# Patient Record
Sex: FEMALE | Race: OTHER | NOT HISPANIC OR LATINO
[De-identification: names, ages, dates, MRNs, and addresses within clinical notes are randomized per-mention and may not be internally consistent; named-entity substitution may affect disease eponyms.]

---

## 2023-07-26 ENCOUNTER — NON-APPOINTMENT (OUTPATIENT)
Age: 23
End: 2023-07-26

## 2023-07-26 ENCOUNTER — APPOINTMENT (OUTPATIENT)
Dept: OBGYN | Facility: CLINIC | Age: 23
End: 2023-07-26
Payer: MEDICAID

## 2023-07-26 VITALS
SYSTOLIC BLOOD PRESSURE: 108 MMHG | DIASTOLIC BLOOD PRESSURE: 62 MMHG | WEIGHT: 122.6 LBS | BODY MASS INDEX: 20.43 KG/M2 | HEIGHT: 65 IN

## 2023-07-26 DIAGNOSIS — Z11.1 ENCOUNTER FOR SCREENING FOR RESPIRATORY TUBERCULOSIS: ICD-10-CM

## 2023-07-26 DIAGNOSIS — Z13.1 ENCOUNTER FOR SCREENING FOR DIABETES MELLITUS: ICD-10-CM

## 2023-07-26 PROBLEM — Z00.00 ENCOUNTER FOR PREVENTIVE HEALTH EXAMINATION: Status: ACTIVE | Noted: 2023-07-26

## 2023-07-26 PROCEDURE — 81003 URINALYSIS AUTO W/O SCOPE: CPT | Mod: QW

## 2023-07-26 PROCEDURE — 99204 OFFICE O/P NEW MOD 45 MIN: CPT

## 2023-07-27 LAB
ABO + RH PNL BLD: NORMAL
BILIRUB UR QL STRIP: NORMAL
BLD GP AB SCN SERPL QL: NORMAL
C TRACH RRNA SPEC QL NAA+PROBE: NOT DETECTED
ESTIMATED AVERAGE GLUCOSE: 97 MG/DL
GLUCOSE 1H P 50 G GLC PO SERPL-MCNC: 126 MG/DL
GLUCOSE UR-MCNC: NORMAL
HBA1C MFR BLD HPLC: 5 %
HBV SURFACE AG SER QL: NONREACTIVE
HCG UR QL: 1 EU/DL
HCV AB SER QL: NONREACTIVE
HCV S/CO RATIO: 0.11 S/CO
HGB A MFR BLD: 97.6 %
HGB A2 MFR BLD: 2.4 %
HGB FRACT BLD-IMP: NORMAL
HGB UR QL STRIP.AUTO: NORMAL
HIV1+2 AB SPEC QL IA.RAPID: NONREACTIVE
KETONES UR-MCNC: NORMAL
LEUKOCYTE ESTERASE UR QL STRIP: ABNORMAL
MEV IGG FLD QL IA: 187 AU/ML
MEV IGG+IGM SER-IMP: POSITIVE
N GONORRHOEA RRNA SPEC QL NAA+PROBE: NOT DETECTED
NITRITE UR QL STRIP: NORMAL
PH UR STRIP: 7
PROT UR STRIP-MCNC: ABNORMAL
RUBV IGG FLD-ACNC: 23 INDEX
RUBV IGG SER-IMP: POSITIVE
SOURCE AMPLIFICATION: NORMAL
SP GR UR STRIP: 1.01
T PALLIDUM AB SER QL IA: NEGATIVE
TSH SERPL-ACNC: 1.12 UIU/ML

## 2023-07-28 PROBLEM — Z13.1 SCREENING FOR DIABETES MELLITUS: Status: ACTIVE | Noted: 2023-07-28

## 2023-07-28 PROBLEM — Z11.1 SCREENING FOR TUBERCULOSIS: Status: ACTIVE | Noted: 2023-07-28

## 2023-08-01 ENCOUNTER — NON-APPOINTMENT (OUTPATIENT)
Age: 23
End: 2023-08-01

## 2023-08-01 LAB
BACTERIA UR CULT: NORMAL
M TB IFN-G BLD-IMP: NEGATIVE
QUANTIFERON TB PLUS MITOGEN MINUS NIL: 4.84 IU/ML
QUANTIFERON TB PLUS NIL: 0.02 IU/ML
QUANTIFERON TB PLUS TB1 MINUS NIL: 0.01 IU/ML
QUANTIFERON TB PLUS TB2 MINUS NIL: 0.02 IU/ML

## 2023-08-02 LAB
AR GENE MUT ANL BLD/T: NORMAL
FMR1 GENE MUT ANL BLD/T: NORMAL
VZV AB TITR SER: NEGATIVE
VZV IGG SER IF-ACNC: <10 INDEX

## 2023-08-03 LAB — CFTR MUT TESTED BLD/T: NEGATIVE

## 2023-08-08 ENCOUNTER — APPOINTMENT (OUTPATIENT)
Dept: ANTEPARTUM | Facility: CLINIC | Age: 23
End: 2023-08-08
Payer: MEDICAID

## 2023-08-08 ENCOUNTER — APPOINTMENT (OUTPATIENT)
Dept: OBGYN | Facility: CLINIC | Age: 23
End: 2023-08-08
Payer: MEDICAID

## 2023-08-08 ENCOUNTER — ASOB RESULT (OUTPATIENT)
Age: 23
End: 2023-08-08

## 2023-08-08 VITALS
SYSTOLIC BLOOD PRESSURE: 122 MMHG | BODY MASS INDEX: 20.33 KG/M2 | DIASTOLIC BLOOD PRESSURE: 78 MMHG | WEIGHT: 122 LBS | HEIGHT: 65 IN

## 2023-08-08 PROCEDURE — 99213 OFFICE O/P EST LOW 20 MIN: CPT

## 2023-08-08 PROCEDURE — 76815 OB US LIMITED FETUS(S): CPT

## 2023-08-10 LAB
BILIRUB UR QL STRIP: NORMAL
GLUCOSE UR-MCNC: NORMAL
HCG UR QL: 0.2 EU/DL
HGB UR QL STRIP.AUTO: NORMAL
KETONES UR-MCNC: NORMAL
LEUKOCYTE ESTERASE UR QL STRIP: NORMAL
NITRITE UR QL STRIP: NORMAL
PH UR STRIP: 6
PROT UR STRIP-MCNC: NORMAL
SP GR UR STRIP: <=1.005

## 2023-08-23 ENCOUNTER — APPOINTMENT (OUTPATIENT)
Dept: OBGYN | Facility: CLINIC | Age: 23
End: 2023-08-23
Payer: MEDICAID

## 2023-08-23 VITALS
SYSTOLIC BLOOD PRESSURE: 110 MMHG | DIASTOLIC BLOOD PRESSURE: 78 MMHG | WEIGHT: 127.4 LBS | HEIGHT: 65 IN | BODY MASS INDEX: 21.23 KG/M2

## 2023-08-23 PROCEDURE — 99213 OFFICE O/P EST LOW 20 MIN: CPT

## 2023-08-24 LAB
BILIRUB UR QL STRIP: NORMAL
GLUCOSE UR-MCNC: NORMAL
HCG UR QL: 0.2 EU/DL
HGB UR QL STRIP.AUTO: NORMAL
HIV1+2 AB SPEC QL IA.RAPID: NONREACTIVE
KETONES UR-MCNC: NORMAL
LEUKOCYTE ESTERASE UR QL STRIP: NORMAL
NITRITE UR QL STRIP: NORMAL
PH UR STRIP: 5.5
PROT UR STRIP-MCNC: NORMAL
SP GR UR STRIP: 1.01
T PALLIDUM AB SER QL IA: NEGATIVE

## 2023-08-27 LAB — B-HEM STREP SPEC QL CULT: NORMAL

## 2023-08-27 RX ORDER — FAMOTIDINE 20 MG/1
20 TABLET, FILM COATED ORAL TWICE DAILY
Qty: 60 | Refills: 2 | Status: ACTIVE | COMMUNITY
Start: 2023-08-27 | End: 1900-01-01

## 2023-08-30 ENCOUNTER — NON-APPOINTMENT (OUTPATIENT)
Age: 23
End: 2023-08-30

## 2023-08-30 ENCOUNTER — APPOINTMENT (OUTPATIENT)
Dept: OBGYN | Facility: CLINIC | Age: 23
End: 2023-08-30

## 2023-08-31 ENCOUNTER — NON-APPOINTMENT (OUTPATIENT)
Age: 23
End: 2023-08-31

## 2023-09-05 ENCOUNTER — NON-APPOINTMENT (OUTPATIENT)
Age: 23
End: 2023-09-05

## 2023-09-05 ENCOUNTER — APPOINTMENT (OUTPATIENT)
Dept: OBGYN | Facility: CLINIC | Age: 23
End: 2023-09-05
Payer: MEDICAID

## 2023-09-05 VITALS — SYSTOLIC BLOOD PRESSURE: 105 MMHG | DIASTOLIC BLOOD PRESSURE: 65 MMHG | WEIGHT: 131 LBS

## 2023-09-05 PROCEDURE — 0502F SUBSEQUENT PRENATAL CARE: CPT

## 2023-09-06 LAB
BILIRUB UR QL STRIP: NORMAL
GLUCOSE UR-MCNC: NORMAL
HCG UR QL: 0.2 EU/DL
HGB UR QL STRIP.AUTO: NORMAL
KETONES UR-MCNC: NORMAL
LEUKOCYTE ESTERASE UR QL STRIP: ABNORMAL
NITRITE UR QL STRIP: NORMAL
PH UR STRIP: 7
PROT UR STRIP-MCNC: NORMAL
SP GR UR STRIP: 1.02

## 2023-09-07 ENCOUNTER — INPATIENT (INPATIENT)
Facility: HOSPITAL | Age: 23
LOS: 1 days | Discharge: HOME | End: 2023-09-09
Attending: OBSTETRICS & GYNECOLOGY | Admitting: OBSTETRICS & GYNECOLOGY
Payer: MEDICAID

## 2023-09-07 DIAGNOSIS — O26.899 OTHER SPECIFIED PREGNANCY RELATED CONDITIONS, UNSPECIFIED TRIMESTER: ICD-10-CM

## 2023-09-08 VITALS — HEART RATE: 81 BPM | DIASTOLIC BLOOD PRESSURE: 71 MMHG | SYSTOLIC BLOOD PRESSURE: 109 MMHG

## 2023-09-08 DIAGNOSIS — O26.893 OTHER SPECIFIED PREGNANCY RELATED CONDITIONS, THIRD TRIMESTER: ICD-10-CM

## 2023-09-08 LAB
ANISOCYTOSIS BLD QL: SIGNIFICANT CHANGE UP
BASOPHILS # BLD AUTO: 0.02 K/UL — SIGNIFICANT CHANGE UP (ref 0–0.2)
BASOPHILS NFR BLD AUTO: 0.2 % — SIGNIFICANT CHANGE UP (ref 0–2)
BLD GP AB SCN SERPL QL: SIGNIFICANT CHANGE UP
EOSINOPHIL # BLD AUTO: 0.09 K/UL — SIGNIFICANT CHANGE UP (ref 0–0.5)
EOSINOPHIL NFR BLD AUTO: 1.1 % — SIGNIFICANT CHANGE UP (ref 0–6)
HCT VFR BLD CALC: 30.5 % — LOW (ref 34.5–45)
HGB BLD-MCNC: 9.3 G/DL — LOW (ref 11.5–15.5)
HYPOCHROMIA BLD QL: SLIGHT — SIGNIFICANT CHANGE UP
IMM GRANULOCYTES NFR BLD AUTO: 0.5 % — SIGNIFICANT CHANGE UP (ref 0–0.9)
LYMPHOCYTES # BLD AUTO: 1.73 K/UL — SIGNIFICANT CHANGE UP (ref 1–3.3)
LYMPHOCYTES # BLD AUTO: 20.5 % — SIGNIFICANT CHANGE UP (ref 13–44)
MACROCYTES BLD QL: SLIGHT — SIGNIFICANT CHANGE UP
MANUAL SMEAR VERIFICATION: SIGNIFICANT CHANGE UP
MCHC RBC-ENTMCNC: 25.2 PG — LOW (ref 27–34)
MCHC RBC-ENTMCNC: 30.5 GM/DL — LOW (ref 32–36)
MCV RBC AUTO: 82.7 FL — SIGNIFICANT CHANGE UP (ref 80–100)
MICROCYTES BLD QL: SIGNIFICANT CHANGE UP
MONOCYTES # BLD AUTO: 0.56 K/UL — SIGNIFICANT CHANGE UP (ref 0–0.9)
MONOCYTES NFR BLD AUTO: 6.6 % — SIGNIFICANT CHANGE UP (ref 2–14)
NEUTROPHILS # BLD AUTO: 5.99 K/UL — SIGNIFICANT CHANGE UP (ref 1.8–7.4)
NEUTROPHILS NFR BLD AUTO: 71.1 % — SIGNIFICANT CHANGE UP (ref 43–77)
OVALOCYTES BLD QL SMEAR: SLIGHT — SIGNIFICANT CHANGE UP
PLAT MORPH BLD: NORMAL — SIGNIFICANT CHANGE UP
PLATELET # BLD AUTO: 154 K/UL — SIGNIFICANT CHANGE UP (ref 150–400)
POIKILOCYTOSIS BLD QL AUTO: SLIGHT — SIGNIFICANT CHANGE UP
POLYCHROMASIA BLD QL SMEAR: SLIGHT — SIGNIFICANT CHANGE UP
RBC # BLD: 3.69 M/UL — LOW (ref 3.8–5.2)
RBC # FLD: 25 % — HIGH (ref 10.3–14.5)
RBC BLD AUTO: ABNORMAL
SPHEROCYTES BLD QL SMEAR: SLIGHT — SIGNIFICANT CHANGE UP
T PALLIDUM AB TITR SER: NEGATIVE — SIGNIFICANT CHANGE UP
TARGETS BLD QL SMEAR: SLIGHT — SIGNIFICANT CHANGE UP
WBC # BLD: 8.43 K/UL — SIGNIFICANT CHANGE UP (ref 3.8–10.5)
WBC # FLD AUTO: 8.43 K/UL — SIGNIFICANT CHANGE UP (ref 3.8–10.5)

## 2023-09-08 PROCEDURE — 59409 OBSTETRICAL CARE: CPT | Mod: U7

## 2023-09-08 RX ORDER — IBUPROFEN 200 MG
600 TABLET ORAL EVERY 6 HOURS
Refills: 0 | Status: DISCONTINUED | OUTPATIENT
Start: 2023-09-08 | End: 2023-09-09

## 2023-09-08 RX ORDER — AER TRAVELER 0.5 G/1
1 SOLUTION RECTAL; TOPICAL EVERY 4 HOURS
Refills: 0 | Status: DISCONTINUED | OUTPATIENT
Start: 2023-09-08 | End: 2023-09-09

## 2023-09-08 RX ORDER — CITRIC ACID/SODIUM CITRATE 300-500 MG
30 SOLUTION, ORAL ORAL ONCE
Refills: 0 | Status: DISCONTINUED | OUTPATIENT
Start: 2023-09-08 | End: 2023-09-08

## 2023-09-08 RX ORDER — CHLORHEXIDINE GLUCONATE 213 G/1000ML
1 SOLUTION TOPICAL DAILY
Refills: 0 | Status: DISCONTINUED | OUTPATIENT
Start: 2023-09-08 | End: 2023-09-08

## 2023-09-08 RX ORDER — OXYTOCIN 10 UNIT/ML
41.67 VIAL (ML) INJECTION
Qty: 20 | Refills: 0 | Status: DISCONTINUED | OUTPATIENT
Start: 2023-09-08 | End: 2023-09-09

## 2023-09-08 RX ORDER — KETOROLAC TROMETHAMINE 30 MG/ML
30 SYRINGE (ML) INJECTION ONCE
Refills: 0 | Status: DISCONTINUED | OUTPATIENT
Start: 2023-09-08 | End: 2023-09-08

## 2023-09-08 RX ORDER — SIMETHICONE 80 MG/1
80 TABLET, CHEWABLE ORAL EVERY 4 HOURS
Refills: 0 | Status: DISCONTINUED | OUTPATIENT
Start: 2023-09-08 | End: 2023-09-09

## 2023-09-08 RX ORDER — IBUPROFEN 200 MG
600 TABLET ORAL EVERY 6 HOURS
Refills: 0 | Status: COMPLETED | OUTPATIENT
Start: 2023-09-08 | End: 2024-08-06

## 2023-09-08 RX ORDER — MAGNESIUM HYDROXIDE 400 MG/1
30 TABLET, CHEWABLE ORAL
Refills: 0 | Status: DISCONTINUED | OUTPATIENT
Start: 2023-09-08 | End: 2023-09-09

## 2023-09-08 RX ORDER — HYDROCORTISONE 1 %
1 OINTMENT (GRAM) TOPICAL EVERY 6 HOURS
Refills: 0 | Status: DISCONTINUED | OUTPATIENT
Start: 2023-09-08 | End: 2023-09-09

## 2023-09-08 RX ORDER — LANOLIN
1 OINTMENT (GRAM) TOPICAL EVERY 6 HOURS
Refills: 0 | Status: DISCONTINUED | OUTPATIENT
Start: 2023-09-08 | End: 2023-09-09

## 2023-09-08 RX ORDER — PRAMOXINE HYDROCHLORIDE 150 MG/15G
1 AEROSOL, FOAM RECTAL EVERY 4 HOURS
Refills: 0 | Status: DISCONTINUED | OUTPATIENT
Start: 2023-09-08 | End: 2023-09-09

## 2023-09-08 RX ORDER — OXYTOCIN 10 UNIT/ML
333.33 VIAL (ML) INJECTION
Qty: 20 | Refills: 0 | Status: COMPLETED | OUTPATIENT
Start: 2023-09-08 | End: 2023-09-08

## 2023-09-08 RX ORDER — OXYTOCIN 10 UNIT/ML
333.33 VIAL (ML) INJECTION
Qty: 20 | Refills: 0 | Status: DISCONTINUED | OUTPATIENT
Start: 2023-09-08 | End: 2023-09-08

## 2023-09-08 RX ORDER — SODIUM CHLORIDE 9 MG/ML
1000 INJECTION, SOLUTION INTRAVENOUS
Refills: 0 | Status: DISCONTINUED | OUTPATIENT
Start: 2023-09-08 | End: 2023-09-08

## 2023-09-08 RX ORDER — TETANUS TOXOID, REDUCED DIPHTHERIA TOXOID AND ACELLULAR PERTUSSIS VACCINE, ADSORBED 5; 2.5; 8; 8; 2.5 [IU]/.5ML; [IU]/.5ML; UG/.5ML; UG/.5ML; UG/.5ML
0.5 SUSPENSION INTRAMUSCULAR ONCE
Refills: 0 | Status: DISCONTINUED | OUTPATIENT
Start: 2023-09-08 | End: 2023-09-09

## 2023-09-08 RX ORDER — ACETAMINOPHEN 500 MG
975 TABLET ORAL
Refills: 0 | Status: DISCONTINUED | OUTPATIENT
Start: 2023-09-08 | End: 2023-09-09

## 2023-09-08 RX ORDER — SODIUM CHLORIDE 9 MG/ML
3 INJECTION INTRAMUSCULAR; INTRAVENOUS; SUBCUTANEOUS EVERY 8 HOURS
Refills: 0 | Status: DISCONTINUED | OUTPATIENT
Start: 2023-09-08 | End: 2023-09-09

## 2023-09-08 RX ORDER — OXYTOCIN 10 UNIT/ML
VIAL (ML) INJECTION
Qty: 30 | Refills: 0 | Status: DISCONTINUED | OUTPATIENT
Start: 2023-09-08 | End: 2023-09-08

## 2023-09-08 RX ORDER — SODIUM CHLORIDE 9 MG/ML
1000 INJECTION, SOLUTION INTRAVENOUS ONCE
Refills: 0 | Status: COMPLETED | OUTPATIENT
Start: 2023-09-08 | End: 2023-09-08

## 2023-09-08 RX ORDER — DIPHENHYDRAMINE HCL 50 MG
25 CAPSULE ORAL EVERY 6 HOURS
Refills: 0 | Status: DISCONTINUED | OUTPATIENT
Start: 2023-09-08 | End: 2023-09-09

## 2023-09-08 RX ORDER — BENZOCAINE 10 %
1 GEL (GRAM) MUCOUS MEMBRANE EVERY 6 HOURS
Refills: 0 | Status: DISCONTINUED | OUTPATIENT
Start: 2023-09-08 | End: 2023-09-09

## 2023-09-08 RX ORDER — ONDANSETRON 8 MG/1
4 TABLET, FILM COATED ORAL ONCE
Refills: 0 | Status: COMPLETED | OUTPATIENT
Start: 2023-09-08 | End: 2023-09-08

## 2023-09-08 RX ORDER — DIBUCAINE 1 %
1 OINTMENT (GRAM) RECTAL EVERY 6 HOURS
Refills: 0 | Status: DISCONTINUED | OUTPATIENT
Start: 2023-09-08 | End: 2023-09-09

## 2023-09-08 RX ADMIN — Medication 30 MILLIGRAM(S): at 20:07

## 2023-09-08 RX ADMIN — Medication 975 MILLIGRAM(S): at 23:39

## 2023-09-08 RX ADMIN — Medication 30 MILLIGRAM(S): at 22:16

## 2023-09-08 RX ADMIN — SODIUM CHLORIDE 3 MILLILITER(S): 9 INJECTION INTRAMUSCULAR; INTRAVENOUS; SUBCUTANEOUS at 22:16

## 2023-09-08 RX ADMIN — SODIUM CHLORIDE 125 MILLILITER(S): 9 INJECTION, SOLUTION INTRAVENOUS at 10:57

## 2023-09-08 RX ADMIN — Medication 1000 MILLIUNIT(S)/MIN: at 18:53

## 2023-09-08 RX ADMIN — ONDANSETRON 4 MILLIGRAM(S): 8 TABLET, FILM COATED ORAL at 11:26

## 2023-09-08 RX ADMIN — SODIUM CHLORIDE 2000 MILLILITER(S): 9 INJECTION, SOLUTION INTRAVENOUS at 11:54

## 2023-09-08 RX ADMIN — Medication 2 MILLIUNIT(S)/MIN: at 10:56

## 2023-09-08 NOTE — OB PROVIDER H&P - NSHPPHYSICALEXAM_GEN_ALL_CORE
T(C): 36.7 (09-08-23 @ 01:51), Max: 36.7 (09-08-23 @ 01:16)  HR: 81 (09-08-23 @ 01:51) (81 - 81)  BP: 109/71 (09-08-23 @ 01:51) (109/71 - 109/71)  RR: 20 (09-08-23 @ 01:51) (20 - 20)  SpO2: --  Gen: NAD, well-appearing   Abd: Soft, gravid  Ext: non-tender, non-edematous  SVE:    Bedside sono:  FHT:  [place]: T(C): 36.7 (09-08-23 @ 01:51), Max: 36.7 (09-08-23 @ 01:16)  HR: 81 (09-08-23 @ 01:51) (81 - 81)  BP: 109/71 (09-08-23 @ 01:51) (109/71 - 109/71)  RR: 20 (09-08-23 @ 01:51) (20 - 20)  Gen: NAD, well-appearing   Abd: Soft, gravid  Ext: non-tender, non-edematous  SVE: 0.5/30/-3  Bedside sono: *  FHT: 120 baseline, moderate variability, + accels, - decels  [place]: quiet T(C): 36.7 (09-08-23 @ 01:51), Max: 36.7 (09-08-23 @ 01:16)  HR: 81 (09-08-23 @ 01:51) (81 - 81)  BP: 109/71 (09-08-23 @ 01:51) (109/71 - 109/71)  RR: 20 (09-08-23 @ 01:51) (20 - 20)  Gen: NAD, well-appearing   Abd: Soft, gravid  Ext: non-tender, non-edematous  SVE: 0.5/30/-3  FHT: 120 baseline, moderate variability, + accels, - decels  [place]: quiet

## 2023-09-08 NOTE — OB PROVIDER H&P - ATTENDING COMMENTS
23y  at 38+1 weeks GA by LMP who presents to L&D for SROM. Patient states that she felt a leakage of fluid around 9pm. Patient denies vaginal bleeding, contractions, fevers, chills, nausea, and vomiting. She endorses good fetal movement.  HR: 81 (23 @ 01:51) (81 - 81)  BP: 109/71 (23 @ 01:51) (109/71 - 109/)  FHT - reactive  [place] - q 5-7  SVE - 0.5/30/-2  22 y/o  @ 38+1 with PROM   - admit to L&D  - consent obtained  - expectant management will consider augmentation    Sarita Roman MD

## 2023-09-08 NOTE — OB PROVIDER LABOR PROGRESS NOTE - ASSESSMENT
Cat II tracing  proressing in labor  will start to push    discussed with attending Dr Irving
Cat I tracing  p;rogressing in labor  continue to uptitrate pitocin when possible    discussed with attending Dr Irving

## 2023-09-08 NOTE — OB PROVIDER DELIVERY SUMMARY - NSSELHIDDEN_OBGYN_ALL_OB_FT
[NS_DeliveryAttending1_OBGYN_ALL_OB_FT:[id number]],[NS_DeliveryRN_OBGYN_ALL_OB_FT:[id number]=]

## 2023-09-08 NOTE — OB PROVIDER H&P - HISTORY OF PRESENT ILLNESS
23y GP at _ weeks GA by LMP consistent with trimester sono who presents to L&D for .   ACOSTA:  LMP:   Prenatal course is significant for:  Prenatal course uncomplicated.      Patient denies vaginal bleeding, contractions and leakage of fluid. She endorses good fetal movement. Denies fevers, chills, nausea and vomiting. No other complaints at this time.     POB:  PGYN: -fibroids, -ovarian cysts, denies STD hx, denies abnormal PAPs   PMH: Denies  PSH: Denies  SH: Denies EtOH, tobacco and illicit drug use during this pregnancy; feels safe at home   Meds: PNVs  Allergies: NKDA    BMI:  Sono:  EFW:        T(C): 36.7 (09-08-23 @ 01:51), Max: 36.7 (09-08-23 @ 01:16)  HR: 81 (09-08-23 @ 01:51) (81 - 81)  BP: 109/71 (09-08-23 @ 01:51) (109/71 - 109/71)  RR: 20 (09-08-23 @ 01:51) (20 - 20)  SpO2: --  Gen: NAD, well-appearing   Abd: Soft, gravid  Ext: non-tender, non-edematous  SVE:    Bedside sono:  FHT:  [place]:           A/P:   -Admit to L&D  -Consent  -Admission labs  -NPO, except ice chips   -IV fluids  -Labor: Intact/*ROM. Latent/Active labor. [place] *.   -Fetus: Cat I tracing. Continuous toco and fetal monitoring.   -GBS: Negative, no GBS ppx required   -Analgesia:   -DVT ppx: Ambulate and SCD's while in bed     Discussed with Dr. Roman 23y  at 38+1 weeks GA by LMP who presents to L&D for SROM.   ACOSTA: 2023  LMP: 12/15/2022  Prenatal course is significant for: MELISSA from [place] July 15  Prenatal course uncomplicated.      Patient states that she felt a leakage of fluid around 9pm. Patient denies vaginal bleeding, contractions, fevers, chills, nausea, and vomiting. She endorses good fetal movement. No other complaints at this time.     POB: , PNC with Dr. Irving/Dr. Gayle (started late July/early Aug after moving from [facility] where she was receiving care)  PGYN: -fibroids, -ovarian cysts, denies STD hx, has not had a pap smear   PMH: GERD  PSH: Denies  SH: Denies EtOH, tobacco and illicit drug use during this pregnancy; feels safe at home   Meds: ondansetron, omeprazole  Allergies: NKDA    BMI: 21.6  Sono: 23 vertex, posterior right lateral placenta  EFW: 23 2130g (26%)   23y  at 38+1 weeks GA by LMP who presents to L&D for SROM. Patient states that she felt a leakage of fluid around 9pm. Patient denies vaginal bleeding, contractions, fevers, chills, nausea, and vomiting. She endorses good fetal movement. No other complaints at this time.   ACOSTA: 2023  LMP: 12/15/2022  Prenatal course is significant for: MELISSA from [facility] July 15  Prenatal course uncomplicated.          POB: , PNC with Dr. Irving/Dr. Gayle (started late July/early Aug after moving from [facility] where she was receiving care)  PGYN: -fibroids, -ovarian cysts, denies STD hx, has not had a pap smear   PMH: GERD  PSH: Denies  SH: Denies EtOH, tobacco and illicit drug use during this pregnancy; feels safe at home   Meds: ondansetron, omeprazole  Allergies: NKDA    BMI: 21.6  Sono: 23 vertex, posterior right lateral placenta  EFW: 23 2130g (26%)   23y  at 38+1 weeks GA by LMP who presents to L&D for SROM. Patient states that she felt a leakage of fluid around 9pm. Patient denies vaginal bleeding, contractions, fevers, chills, nausea, and vomiting. She endorses good fetal movement. No other complaints at this time.     ACOSTA: 2023  LMP: 12/15/2022  Prenatal course is significant for: MELISSA from [facility] July 15  Prenatal course uncomplicated.          POB: , PNC with Dr. Irving/Dr. Gayle (started late July/early Aug after moving from [facility] where she was receiving care)  PGYN: -fibroids, -ovarian cysts, denies STD hx, has not had a pap smear   PMH: GERD  PSH: Denies  SH: Denies EtOH, tobacco and illicit drug use during this pregnancy; feels safe at home   Meds: ondansetron, omeprazole  Allergies: NKDA    BMI: 21.6  Sono: 23 vertex, posterior right lateral placenta  EFW: 23 2130g (26%)

## 2023-09-08 NOTE — OB RN DELIVERY SUMMARY - NSSELHIDDEN_OBGYN_ALL_OB_FT
[NS_DeliveryAttending1_OBGYN_ALL_OB_FT:[id number]],[NS_DeliveryRN_OBGYN_ALL_OB_FT:[id number]=]

## 2023-09-08 NOTE — OB RN DELIVERY SUMMARY - NS_SEPSISRSKCALC_OBGYN_ALL_OB_FT
EOS calculated successfully. EOS Risk Factor: 0.5/1000 live births ([facility] national incidence); GA=38w1d; Temp=98.78; ROM=21.883; GBS='Negative'; Antibiotics='No antibiotics or any antibiotics < 2 hrs prior to birth'   EOS calculated successfully. EOS Risk Factor: 0.5/1000 live births ([facility] national incidence); GA=38w1d; Temp=99.1; ROM=21.883; GBS='Negative'; Antibiotics='No antibiotics or any antibiotics < 2 hrs prior to birth'

## 2023-09-08 NOTE — OB PROVIDER H&P - ASSESSMENT
A/P:   -Admit to L&D  -Consent  -Admission labs  -NPO, except ice chips   -IV fluids  -Labor: Intact/*ROM. Latent/Active labor. [place] *.   -Fetus: Cat I tracing. Continuous toco and fetal monitoring.   -GBS: Negative, no GBS ppx required   -Analgesia:   -DVT ppx: Ambulate and SCD's while in bed     Discussed with Dr. Roman A/P:   23y  at 38+1 weeks GA by LMP who presents to L&D for SROM.     -Admit to L&D for SROM  -Consent  -Admission labs ordered  -NPO, except ice chips   -IV fluids  -Labor: ROM.   -Fetus: Cat I tracing. Continuous toco and fetal monitoring.   -GBS: Negative, no GBS ppx required   -Analgesia: epi prn  -Desires circumcision for baby  -DVT ppx: Ambulate and SCD's while in bed     Discussed with Dr. Roman A/P: 23y  at 38+1 weeks GA by LMP who presents to L&D for SROM.     -Admit to L&D for SROM  -Consent  -Admission labs ordered  -NPO, except ice chips   -IV fluids  -Labor: ROM.   -Fetus: Cat I tracing. Continuous toco and fetal monitoring.   -GBS: Negative, no GBS ppx required   -Analgesia: epi prn  -Desires circumcision for baby  -DVT ppx: Ambulate and SCD's while in bed     Discussed with Dr. Roman

## 2023-09-08 NOTE — OB PROVIDER H&P - NSHPSOCIALHISTORY_GEN_ALL_CORE
Moved from [place] July 15th  Living with in-laws (mother in law, father in law, 3 brother in laws)  FOB in [place] for school

## 2023-09-08 NOTE — OB PROVIDER DELIVERY SUMMARY - NSPROVIDERDELIVERYNOTE_OBGYN_ALL_OB_FT
- Patient felt rectal pressure and was found to be fully dilated  - Spencer catheter was removed and patient was positioned for pushing  - She was prepped and draped for delivery  - Delivered viable male infant  - Head delivered OA  - No nuchal cord  - Shoulders and body delivered without difficulty  - Cord clamped x2 and cut. Cord blood collected  - Placenta delivered spontaneously, intact, no gross pathology, 3 vessel cord  - Perineum and vagina inspected, 2nd degree laceration noted and repaired, excellent hemostasis achieved  - EBL 250cc, no complications  - Ada: male sex assigned at birth, apgars 9/9

## 2023-09-08 NOTE — OB PROVIDER LABOR PROGRESS NOTE - NS_OBIHIFHRDETAILS_OBGYN_ALL_OB_FT
baseline 120, moderate variability, no accels, no decels
baseline 125, moderate variability, +accels, variable decelerations

## 2023-09-08 NOTE — OB RN PATIENT PROFILE - FUNCTIONAL ASSESSMENT - DAILY ACTIVITY 4.
Is This A New Presentation, Or A Follow-Up?: Skin Lesion
How Severe Is Your Skin Lesion?: mild
Has Your Skin Lesion Been Treated?: not been treated
Which Family Member (Optional)?: Mom and dad, face
4 = No assist / stand by assistance

## 2023-09-08 NOTE — CHART NOTE - NSCHARTNOTEFT_GEN_A_CORE
labor progress  she received an epidural  tracing is reassuring   I checked her with russell placement and she is 4/90/-1   continue edgar Canseco

## 2023-09-08 NOTE — OB PROVIDER H&P - NSLOWPPHRISK_OBGYN_A_OB
No previous uterine incision/[place] Pregnancy/Less than or equal to 4 previous vaginal births/No known bleeding disorder/No history of postpartum hemorrhage/No other PPH risks indicated

## 2023-09-09 ENCOUNTER — TRANSCRIPTION ENCOUNTER (OUTPATIENT)
Age: 23
End: 2023-09-09

## 2023-09-09 LAB
HCT VFR BLD CALC: 27.7 % — LOW (ref 34.5–45)
HGB BLD-MCNC: 8.5 G/DL — LOW (ref 11.5–15.5)

## 2023-09-09 RX ADMIN — Medication 600 MILLIGRAM(S): at 04:00

## 2023-09-09 RX ADMIN — Medication 600 MILLIGRAM(S): at 14:58

## 2023-09-09 RX ADMIN — Medication 975 MILLIGRAM(S): at 00:00

## 2023-09-09 RX ADMIN — Medication 975 MILLIGRAM(S): at 12:32

## 2023-09-09 RX ADMIN — Medication 600 MILLIGRAM(S): at 21:57

## 2023-09-09 RX ADMIN — Medication 600 MILLIGRAM(S): at 02:46

## 2023-09-09 RX ADMIN — Medication 975 MILLIGRAM(S): at 18:30

## 2023-09-09 RX ADMIN — Medication 1 TABLET(S): at 12:32

## 2023-09-09 RX ADMIN — SODIUM CHLORIDE 3 MILLILITER(S): 9 INJECTION INTRAMUSCULAR; INTRAVENOUS; SUBCUTANEOUS at 05:48

## 2023-09-09 RX ADMIN — SODIUM CHLORIDE 3 MILLILITER(S): 9 INJECTION INTRAMUSCULAR; INTRAVENOUS; SUBCUTANEOUS at 14:02

## 2023-09-09 RX ADMIN — Medication 975 MILLIGRAM(S): at 06:17

## 2023-09-09 RX ADMIN — Medication 600 MILLIGRAM(S): at 09:11

## 2023-09-09 NOTE — DISCHARGE NOTE OB - PLAN OF CARE
Patient should transition to regular activity level. Resume regular diet. Patient should follow up with her OB for postpartum checkup in 2-3 weeks. Patient should call her doctor sooner if she develops fever or uncontrolled vaginal bleeding. Take medications as directed. Exclusive breast feeding for the first 6 months is recommended. Nothing per vagina for 6 weeks (incl. sex, douching, etc). If you have additional concerns, please inform your provider. Patient with mild anemia secondary to acute blood loss with delivery, stable. Patient should follow with OB  at regular postpartum check, and to call doctor sooner if develop symptoms of anemia such as shortness of breath, lightheadedness, or fainting. If medication such as ferrous sulfate is prescribed, take as directed.

## 2023-09-09 NOTE — DISCHARGE NOTE OB - CARE PLAN
1 Principal Discharge DX:	 (normal spontaneous vaginal delivery)  Assessment and plan of treatment:	Patient should transition to regular activity level. Resume regular diet. Patient should follow up with her OB for postpartum checkup in 2-3 weeks. Patient should call her doctor sooner if she develops fever or uncontrolled vaginal bleeding. Take medications as directed. Exclusive breast feeding for the first 6 months is recommended. Nothing per vagina for 6 weeks (incl. sex, douching, etc). If you have additional concerns, please inform your provider.  Secondary Diagnosis:	Anemia due to acute blood loss  Assessment and plan of treatment:	Patient with mild anemia secondary to acute blood loss with delivery, stable. Patient should follow with OB  at regular postpartum check, and to call doctor sooner if develop symptoms of anemia such as shortness of breath, lightheadedness, or fainting. If medication such as ferrous sulfate is prescribed, take as directed.

## 2023-09-09 NOTE — DISCHARGE NOTE OB - CARE PROVIDER_API CALL
Lakia Canseco  Obstetrics and Gynecology  [address] [facility], [address] [place] [id number]  Phone: [phone]  Fax: [phone]  Established Patient  Follow Up Time: 2 weeks

## 2023-09-09 NOTE — DISCHARGE NOTE OB - PATIENT PORTAL LINK FT
You can access the FollowMyHealth Patient Portal offered by [facility] by registering at the following website: [email or web address]. By joining [facility]’s FollowMyHealth portal, you will also be able to view your health information using other applications (apps) compatible with our system.

## 2023-09-10 VITALS
OXYGEN SATURATION: 100 % | DIASTOLIC BLOOD PRESSURE: 66 MMHG | TEMPERATURE: 98 F | RESPIRATION RATE: 18 BRPM | SYSTOLIC BLOOD PRESSURE: 99 MMHG | HEART RATE: 76 BPM

## 2023-09-10 PROCEDURE — 86901 BLOOD TYPING SEROLOGIC RH(D): CPT

## 2023-09-10 PROCEDURE — 85014 HEMATOCRIT: CPT

## 2023-09-10 PROCEDURE — 86900 BLOOD TYPING SEROLOGIC ABO: CPT

## 2023-09-10 PROCEDURE — 36415 COLL VENOUS BLD VENIPUNCTURE: CPT

## 2023-09-10 PROCEDURE — 85018 HEMOGLOBIN: CPT

## 2023-09-10 PROCEDURE — 85025 COMPLETE CBC W/AUTO DIFF WBC: CPT

## 2023-09-10 PROCEDURE — 86850 RBC ANTIBODY SCREEN: CPT

## 2023-09-10 PROCEDURE — 86780 TREPONEMA PALLIDUM: CPT

## 2023-09-10 RX ORDER — IBUPROFEN 200 MG
1 TABLET ORAL
Qty: 20 | Refills: 0
Start: 2023-09-10 | End: 2023-09-14

## 2023-09-10 RX ORDER — ACETAMINOPHEN 500 MG
2 TABLET ORAL
Qty: 40 | Refills: 0
Start: 2023-09-10 | End: 2023-09-14

## 2023-09-10 RX ADMIN — Medication 975 MILLIGRAM(S): at 00:15

## 2023-09-10 RX ADMIN — SODIUM CHLORIDE 3 MILLILITER(S): 9 INJECTION INTRAMUSCULAR; INTRAVENOUS; SUBCUTANEOUS at 06:15

## 2023-09-10 RX ADMIN — Medication 600 MILLIGRAM(S): at 03:22

## 2023-09-10 RX ADMIN — Medication 1 TABLET(S): at 12:58

## 2023-09-10 RX ADMIN — Medication 600 MILLIGRAM(S): at 12:58

## 2023-09-10 RX ADMIN — Medication 600 MILLIGRAM(S): at 04:00

## 2023-09-10 NOTE — PROGRESS NOTE ADULT - ATTENDING COMMENTS
day 2 doing well  Will circ then discharge home  Lakia Canseco
23y  now PPD#1 s/p spontaneous vaginal delivery at 38w1d gestation, c/b 2nd degree lac.  - currently with minimal bleeding/lochia only, no symptoms of anemia, post partum Hgb 8.5, consistent with acute blood loss anemia, plan for PO supplementation  - healthy male infant at bedside, desires circumcision, pending baby clearance   - vital signs, lab results, and physical exam reassuring   - DVT PPX: ambulation  - anticipated discharge: continued inpatient care

## 2023-09-10 NOTE — PROGRESS NOTE ADULT - ASSESSMENT
ASSESSMENT:   MARVIN JOHN is a 23y  PPD2 s/p  cb 2nd degree. Patient reports some vaginal discomfort but is otherwise clinically and hemodynamically stable. Feels ready to go home today   boy is with patient at bedside. Patient reports she desires circumcision.   Hgb: 9.3>8.5  Rub: I/I    #Postpartum  - Continue routine post-partum care  - Pain management PRN  - Encourage maternal- bonding    - Diet: Regular, advance as tolerated  - DVT ppx: Ambulation encouraged  - Dispo: Home today per attending's approval   
A/P:   23y  now PPD#1 s/p spontaneous vaginal delivery at 38w1d gestation, c/b 2nd degree lac.  -Vital signs stable  -Hgb: 9.3 -> AM labs pending   -Voiding, tolerating PO, bowel function nml   -Advance care as tolerated   -Continue routine postpartum care and education  -Healthy male infant, desires circumcision  -Dispo: Continue inpatient management

## 2023-09-10 NOTE — PROGRESS NOTE ADULT - SUBJECTIVE AND OBJECTIVE BOX
23y Female s/p labor epidural on 9/8/23    Vital Signs   T(C): 37.2 (08 Sep 2023 21:52), Max: 37.3 (08 Sep 2023 19:29)  T(F): 99 (08 Sep 2023 21:52), Max: 99.1 (08 Sep 2023 19:29)  HR: 94 (08 Sep 2023 21:52) (71 - 120)  BP: 118/78 (08 Sep 2023 21:52) (107/63 - 144/83)  BP(mean): --  RR: 16 (08 Sep 2023 21:52) (15 - 18)  SpO2: 98% (08 Sep 2023 21:52) (98% - 100%)    Parameters below as of 08 Sep 2023 21:52  Patient On (Oxygen Delivery Method): room air            Patient's overall anesthesia satisfaction: Positive    Patient doing well     No headache      No residual numbness or weakness, sensory and motor function intact    No anesthetic complications or complaints noted or reported                 
SUBJECTIVE:  Patient seen and examined at bedside this morning. No acute events overnight. Reports feeling well this morning, pain is well controlled with PRN pain medication. She says she has some soreness in her vaginal area, reports she hasn't been taking the analgesic spray. Tolerating PO without N/V, voiding spontaneously with no complaints. Denies fevers, chills, shortness of breath, chest pain, headches, vision changes or calf pain    OBJECTIVE:  Vital Signs Last 24 Hrs  T(C): 36.6 (09 Sep 2023 20:42), Max: 36.8 (09 Sep 2023 08:15)  T(F): 97.9 (09 Sep 2023 20:42), Max: 98.2 (09 Sep 2023 08:15)  HR: 86 (09 Sep 2023 20:42) (80 - 86)  BP: 106/71 (09 Sep 2023 20:42) (106/71 - 115/77)      Physical exam:  General: AOx3, NAD.  Heart: S1/S2 with regular rate and normal rhythm.  Lungs: CTABL, no crackles or wheezing.   Abdomen: +BS, Soft, appropriately tender, nondistended, no guarding or rebound tenderness, firm uterine fundus at umbilicus  Ext: BL LE reveal minimal swelling, no popliteal tenderness or skin changes.     LABS:                        8.5    x     )-----------( x        ( 09 Sep 2023 07:08 )             27.7       Antibody Screen: NEG (09-08-23 @ 02:22)      
MARVIN JOHN is a 23y  now PPD#1 s/p spontaneous vaginal delivery at 38w1d gestation, c/b 2nd degree lac.    S:    The patient has no complaints.  Pain controlled with current treatment regimen.   She is ambulating without difficulty and tolerating PO.   + flatus/-BM/+ voiding   She endorses appropriate lochia, which is decreasing.      O:    T(C): 37.2 (23 @ 21:52), Max: 37.3 (23 @ 19:29)  HR: 94 (23 @ 21:52) (71 - 120)  BP: 118/78 (23 @ 21:52) (107/63 - 144/83)  RR: 16 (23 @ 21:52) (15 - 18)  SpO2: 98% (23 @ 21:52) (98% - 100%)    Gen: NAD, AOx3  Pulm: Breathing comfortably on RA  Abdomen:  Soft, non-tender, non-distended  Uterus:  Fundus firm below umbilicus  VE:  +Lochia  Ext:  Non-tender and non-edematous    LABS                        9.3    8.43  )-----------( 154      ( 08 Sep 2023 02:22 )             30.5

## 2023-09-12 PROBLEM — O99.019 ANEMIA COMPLICATING PREGNANCY, UNSPECIFIED TRIMESTER: Chronic | Status: ACTIVE | Noted: 2023-09-08

## 2023-09-13 ENCOUNTER — APPOINTMENT (OUTPATIENT)
Dept: OBGYN | Facility: CLINIC | Age: 23
End: 2023-09-13

## 2023-09-20 ENCOUNTER — APPOINTMENT (OUTPATIENT)
Dept: OBGYN | Facility: CLINIC | Age: 23
End: 2023-09-20
Payer: MEDICAID

## 2023-09-20 VITALS
SYSTOLIC BLOOD PRESSURE: 104 MMHG | HEIGHT: 65 IN | BODY MASS INDEX: 19.99 KG/M2 | DIASTOLIC BLOOD PRESSURE: 60 MMHG | WEIGHT: 120 LBS

## 2023-09-20 DIAGNOSIS — O99.019 ANEMIA COMPLICATING PREGNANCY, UNSPECIFIED TRIMESTER: ICD-10-CM

## 2023-09-20 DIAGNOSIS — O09.33 SUPERVISION OF PREGNANCY WITH INSUFFICIENT ANTENATAL CARE, THIRD TRIMESTER: ICD-10-CM

## 2023-09-20 DIAGNOSIS — O21.9 VOMITING OF PREGNANCY, UNSPECIFIED: ICD-10-CM

## 2023-09-20 DIAGNOSIS — E61.1 IRON DEFICIENCY: ICD-10-CM

## 2023-09-20 DIAGNOSIS — Z34.93 ENCOUNTER FOR SUPERVISION OF NORMAL PREGNANCY, UNSPECIFIED, THIRD TRIMESTER: ICD-10-CM

## 2023-09-20 DIAGNOSIS — O26.13 LOW WEIGHT GAIN IN PREGNANCY, THIRD TRIMESTER: ICD-10-CM

## 2023-09-20 PROCEDURE — 99212 OFFICE O/P EST SF 10 MIN: CPT

## 2023-09-20 RX ORDER — CHLORHEXIDINE GLUCONATE 4 %
325 (65 FE) LIQUID (ML) TOPICAL
Qty: 90 | Refills: 0 | Status: ACTIVE | COMMUNITY
Start: 2023-09-20 | End: 1900-01-01

## 2023-09-20 RX ORDER — PRENATAL VIT NO.130/IRON/FOLIC 27MG-0.8MG
27-0.8 TABLET ORAL
Qty: 60 | Refills: 1 | Status: ACTIVE | COMMUNITY
Start: 2023-09-20 | End: 1900-01-01

## 2023-09-29 NOTE — OB PROVIDER DELIVERY SUMMARY - NSVAGINALEXAMCERT_OBGYN_ALL_OB
The Delivery OB Provider certifies that vaginal examination and/or abdominal examination after the delivery was done and no foreign body was found.
n/a

## 2023-10-18 ENCOUNTER — APPOINTMENT (OUTPATIENT)
Dept: OBGYN | Facility: CLINIC | Age: 23
End: 2023-10-18
Payer: MEDICAID

## 2023-10-18 VITALS
HEIGHT: 65 IN | WEIGHT: 126 LBS | BODY MASS INDEX: 20.99 KG/M2 | DIASTOLIC BLOOD PRESSURE: 62 MMHG | SYSTOLIC BLOOD PRESSURE: 100 MMHG

## 2023-10-18 DIAGNOSIS — Z13.32 ENCOUNTER FOR SCREENING FOR MATERNAL DEPRESSION: ICD-10-CM

## 2024-01-30 ENCOUNTER — APPOINTMENT (OUTPATIENT)
Dept: OBGYN | Facility: CLINIC | Age: 24
End: 2024-01-30
Payer: MEDICAID

## 2024-01-30 VITALS
BODY MASS INDEX: 24.99 KG/M2 | WEIGHT: 135.8 LBS | DIASTOLIC BLOOD PRESSURE: 60 MMHG | SYSTOLIC BLOOD PRESSURE: 100 MMHG | HEIGHT: 62 IN

## 2024-01-30 DIAGNOSIS — Z34.91 ENCOUNTER FOR SUPERVISION OF NORMAL PREGNANCY, UNSPECIFIED, FIRST TRIMESTER: ICD-10-CM

## 2024-01-30 DIAGNOSIS — Z32.01 ENCOUNTER FOR PREGNANCY TEST, RESULT POSITIVE: ICD-10-CM

## 2024-01-30 DIAGNOSIS — N91.1 SECONDARY AMENORRHEA: ICD-10-CM

## 2024-01-30 DIAGNOSIS — O09.899 SUPERVISION OF OTHER HIGH RISK PREGNANCIES, UNSPECIFIED TRIMESTER: ICD-10-CM

## 2024-01-30 PROCEDURE — 99213 OFFICE O/P EST LOW 20 MIN: CPT

## 2024-01-30 PROCEDURE — 81025 URINE PREGNANCY TEST: CPT

## 2024-01-30 RX ORDER — FOLIC ACID 1 MG/1
1 TABLET ORAL DAILY
Qty: 30 | Refills: 3 | Status: ACTIVE | COMMUNITY
Start: 2024-01-30 | End: 1900-01-01

## 2024-01-30 RX ORDER — PRENATAL VIT NO.126/IRON/FOLIC 28MG-0.8MG
28-0.8 TABLET ORAL
Qty: 30 | Refills: 2 | Status: ACTIVE | COMMUNITY
Start: 2024-01-30 | End: 1900-01-01

## 2024-01-31 PROBLEM — O09.899 SHORT INTERVAL BETWEEN PREGNANCIES AFFECTING PREGNANCY, ANTEPARTUM: Status: ACTIVE | Noted: 2024-01-31

## 2024-01-31 PROBLEM — Z32.01 POSITIVE URINE PREGNANCY TEST: Status: ACTIVE | Noted: 2024-01-31

## 2024-01-31 LAB
HCG UR QL: POSITIVE
QUALITY CONTROL: YES

## 2024-01-31 NOTE — HISTORY OF PRESENT ILLNESS
[FreeTextEntry1] : Harlan presents with partner for amenorrhea. LMP was 12/10/23, and she reports that she took a pregnancy test at home that was negative. Pregnancy test here in the office is positive. She is also complaining of nausea, so she was not surprised but did not understand why pregnancy test at home was negative.  They had a baby via  23, that pregnancy was complicated by IUGR.

## 2024-01-31 NOTE — COUNSELING
[Vitamins/Supplements] : vitamins/supplements [Pregnancy Options] : pregnancy options [Lab Results] : lab results

## 2024-01-31 NOTE — DISCUSSION/SUMMARY
[FreeTextEntry1] : Positive pregnancy test was reviewed with the patient. They were surprised but happy. We discussed risks associated with short interval pregnancy. I advised starting PNV, folic acid, and iron (she was anemic during last pregnancy) and rx sent to pharmacy.  She used ondansetron for nausea during last pregnancy and is requesting refill. Rx sent to pharmacy. RTO 1-2 weeks for sono and new OB visit. Ectopic and miscarriage precautions were reviewed.

## 2024-02-07 ENCOUNTER — APPOINTMENT (OUTPATIENT)
Dept: ANTEPARTUM | Facility: CLINIC | Age: 24
End: 2024-02-07
Payer: MEDICAID

## 2024-02-07 ENCOUNTER — ASOB RESULT (OUTPATIENT)
Age: 24
End: 2024-02-07

## 2024-02-07 ENCOUNTER — APPOINTMENT (OUTPATIENT)
Dept: OBGYN | Facility: CLINIC | Age: 24
End: 2024-02-07
Payer: MEDICAID

## 2024-02-07 VITALS
DIASTOLIC BLOOD PRESSURE: 58 MMHG | HEIGHT: 62 IN | WEIGHT: 135 LBS | SYSTOLIC BLOOD PRESSURE: 110 MMHG | BODY MASS INDEX: 24.84 KG/M2

## 2024-02-07 DIAGNOSIS — Z12.4 ENCOUNTER FOR SCREENING FOR MALIGNANT NEOPLASM OF CERVIX: ICD-10-CM

## 2024-02-07 DIAGNOSIS — Z13.29 ENCOUNTER FOR SCREENING FOR OTHER SUSPECTED ENDOCRINE DISORDER: ICD-10-CM

## 2024-02-07 DIAGNOSIS — Z11.4 ENCOUNTER FOR SCREENING FOR HUMAN IMMUNODEFICIENCY VIRUS [HIV]: ICD-10-CM

## 2024-02-07 DIAGNOSIS — Z11.3 ENCOUNTER FOR SCREENING FOR INFECTIONS WITH A PREDOMINANTLY SEXUAL MODE OF TRANSMISSION: ICD-10-CM

## 2024-02-07 PROCEDURE — 76817 TRANSVAGINAL US OBSTETRIC: CPT

## 2024-02-07 PROCEDURE — 99395 PREV VISIT EST AGE 18-39: CPT

## 2024-02-07 PROCEDURE — 0500F INITIAL PRENATAL CARE VISIT: CPT

## 2024-02-07 NOTE — DISCUSSION/SUMMARY
[FreeTextEntry1] :   The benefits of adequate calcium intake and a daily multivitamin along with routine daily cardiovascular exercise were reviewed with the patient.   The patient was informed regarding the benefits of a screening colonoscopy.   The importance of safer-sex was discussed with the patient. We reviewed ASCCP/ACOG guidelines for pap smears.

## 2024-02-07 NOTE — HISTORY OF PRESENT ILLNESS
[FreeTextEntry1] : Swathi is a G_P____ LMP ____ who presents for annual exam. She is without complaints. Denies pelvic pain, abnormal bleeding, or vaginal discharge. Denies issues with bowel or bladder function.  She is sexually active with ____ partner (s). She is using _____ for contraception/she desires pregnancy. Denies pain with intercourse. She is sexually satisfied.  Lives with _____. Works as ______. Feels safe at home. Denies depression/abuse.  Immunizations:

## 2024-02-08 LAB
APPEARANCE: CLEAR
BILIRUBIN URINE: NEGATIVE
BLOOD URINE: NEGATIVE
COLOR: YELLOW
ESTIMATED AVERAGE GLUCOSE: 94 MG/DL
GLUCOSE QUALITATIVE U: NEGATIVE
HBA1C MFR BLD HPLC: 4.9 %
HCT VFR BLD CALC: 39.4 %
HGB BLD-MCNC: 13.1 G/DL
HIV1+2 AB SPEC QL IA.RAPID: NONREACTIVE
KETONES URINE: NEGATIVE
LEUKOCYTE ESTERASE URINE: NEGATIVE
MCHC RBC-ENTMCNC: 28.5 PG
MCHC RBC-ENTMCNC: 33.2 GM/DL
MCV RBC AUTO: 85.8 FL
NITRITE URINE: NEGATIVE
PH URINE: 7
PLATELET # BLD AUTO: 238 K/UL
PROTEIN URINE: ABNORMAL
RBC # BLD: 4.59 M/UL
RBC # FLD: 13.2 %
SPECIFIC GRAVITY URINE: 1.02
TSH SERPL-ACNC: 1.55 UIU/ML
UROBILINOGEN URINE: 1 (ref 0.2–?)
WBC # FLD AUTO: 7.05 K/UL

## 2024-02-09 PROBLEM — Z13.29 SCREENING FOR THYROID DISORDER: Status: ACTIVE | Noted: 2023-07-28

## 2024-02-10 LAB
ABO + RH PNL BLD: NORMAL
BACTERIA UR CULT: NORMAL
BLD GP AB SCN SERPL QL: NORMAL
C TRACH RRNA SPEC QL NAA+PROBE: NOT DETECTED
HBV SURFACE AG SER QL: NONREACTIVE
HCV AB SER QL: NONREACTIVE
HCV S/CO RATIO: 0.14 S/CO
HPV HIGH+LOW RISK DNA PNL CVX: NOT DETECTED
MEV IGG FLD QL IA: 241 AU/ML
MEV IGG+IGM SER-IMP: POSITIVE
N GONORRHOEA RRNA SPEC QL NAA+PROBE: NOT DETECTED
RUBV IGG FLD-ACNC: 21.4 INDEX
RUBV IGG SER-IMP: POSITIVE
SOURCE TP AMPLIFICATION: NORMAL
T PALLIDUM AB SER QL IA: NEGATIVE
VZV AB TITR SER: NEGATIVE
VZV IGG SER IF-ACNC: <10 INDEX

## 2024-02-14 LAB
CYTOLOGY CVX/VAG DOC THIN PREP: NORMAL
M TB IFN-G BLD-IMP: NEGATIVE
QUANTIFERON TB PLUS MITOGEN MINUS NIL: >10 IU/ML
QUANTIFERON TB PLUS NIL: 0.03 IU/ML
QUANTIFERON TB PLUS TB1 MINUS NIL: 0.01 IU/ML
QUANTIFERON TB PLUS TB2 MINUS NIL: 0 IU/ML

## 2024-02-16 ENCOUNTER — NON-APPOINTMENT (OUTPATIENT)
Age: 24
End: 2024-02-16

## 2024-03-06 ENCOUNTER — APPOINTMENT (OUTPATIENT)
Dept: OBGYN | Facility: CLINIC | Age: 24
End: 2024-03-06
Payer: MEDICAID

## 2024-03-06 ENCOUNTER — ASOB RESULT (OUTPATIENT)
Age: 24
End: 2024-03-06

## 2024-03-06 ENCOUNTER — APPOINTMENT (OUTPATIENT)
Dept: ANTEPARTUM | Facility: CLINIC | Age: 24
End: 2024-03-06
Payer: MEDICAID

## 2024-03-06 VITALS
WEIGHT: 134.38 LBS | HEIGHT: 62 IN | DIASTOLIC BLOOD PRESSURE: 50 MMHG | BODY MASS INDEX: 24.73 KG/M2 | SYSTOLIC BLOOD PRESSURE: 82 MMHG

## 2024-03-06 DIAGNOSIS — O21.9 VOMITING OF PREGNANCY, UNSPECIFIED: ICD-10-CM

## 2024-03-06 DIAGNOSIS — Z34.91 ENCOUNTER FOR SUPERVISION OF NORMAL PREGNANCY, UNSPECIFIED, FIRST TRIMESTER: ICD-10-CM

## 2024-03-06 LAB
BILIRUB UR QL STRIP: NEGATIVE
CLARITY UR: CLEAR
COLLECTION METHOD: NORMAL
GLUCOSE UR-MCNC: NEGATIVE
HCG UR QL: 0.2 EU/DL
HGB UR QL STRIP.AUTO: NEGATIVE
KETONES UR-MCNC: NEGATIVE
LEUKOCYTE ESTERASE UR QL STRIP: NEGATIVE
NITRITE UR QL STRIP: NEGATIVE
PH UR STRIP: 6
PROT UR STRIP-MCNC: NEGATIVE
SP GR UR STRIP: 1

## 2024-03-06 PROCEDURE — 0502F SUBSEQUENT PRENATAL CARE: CPT

## 2024-03-06 PROCEDURE — 76813 OB US NUCHAL MEAS 1 GEST: CPT

## 2024-03-11 LAB
ADDITIONAL US: NORMAL
COMMENTS: AFP: NORMAL
CRL SCAN TWIN B: NORMAL
CRL SCAN: NORMAL
CROWN RUMP LENGTH TWIN B: NORMAL
CROWN RUMP LENGTH: 50.8 MM
DOWN SYNDROME AGE RISK: NORMAL
DOWN SYNDROME INTERPRETATION: NORMAL
DOWN SYNDROME SCREENING RISK: NORMAL
GEST. AGE ON COLLECTION DATE: 11.7 WEEKS
HCG MOM: 0.65
HCG VALUE: 77.7 IU/ML
MATERNAL AGE AT EDD: 23.6 YR
NOTE: AFP: NORMAL
NT MOM TWIN B: NORMAL
NT TWIN B: NORMAL
NUCHAL TRANSLUCENCY (NT): 1 MM
NUCHAL TRANSLUCENCY MOM: 0.74
NUMBER OF FETUSES: 1
PAPP-A MOM: 0.31
PAPP-A VALUE: 263 NG/ML
RACE: NORMAL
RESULTS AFP: NORMAL
SONOGRAPHER ID#: NORMAL
SUBMIT PART 2 SAMPLE USING: NORMAL
TEST RESULTS: AFP: NORMAL
TRISOMY 18 AGE RISK: NORMAL
TRISOMY 18 INTERPRETATION: NORMAL
TRISOMY 18 SCREENING RISK: NORMAL
WEIGHT AFP: 135 LBS

## 2024-03-13 ENCOUNTER — APPOINTMENT (OUTPATIENT)
Dept: OBGYN | Facility: CLINIC | Age: 24
End: 2024-03-13

## 2024-03-21 ENCOUNTER — NON-APPOINTMENT (OUTPATIENT)
Age: 24
End: 2024-03-21

## 2024-03-26 ENCOUNTER — NON-APPOINTMENT (OUTPATIENT)
Age: 24
End: 2024-03-26

## 2024-03-26 RX ORDER — ONDANSETRON 4 MG/1
4 TABLET ORAL 4 TIMES DAILY
Qty: 10 | Refills: 0 | Status: ACTIVE | COMMUNITY
Start: 2024-01-30

## 2024-04-10 ENCOUNTER — APPOINTMENT (OUTPATIENT)
Dept: OBGYN | Facility: CLINIC | Age: 24
End: 2024-04-10
Payer: MEDICAID

## 2024-04-10 ENCOUNTER — NON-APPOINTMENT (OUTPATIENT)
Age: 24
End: 2024-04-10

## 2024-04-10 VITALS
SYSTOLIC BLOOD PRESSURE: 110 MMHG | DIASTOLIC BLOOD PRESSURE: 68 MMHG | BODY MASS INDEX: 23.55 KG/M2 | WEIGHT: 128 LBS | HEIGHT: 62 IN

## 2024-04-10 PROCEDURE — 0502F SUBSEQUENT PRENATAL CARE: CPT

## 2024-04-10 RX ORDER — ONDANSETRON 8 MG/1
8 TABLET, ORALLY DISINTEGRATING ORAL
Qty: 20 | Refills: 3 | Status: ACTIVE | COMMUNITY
Start: 2023-08-27 | End: 1900-01-01

## 2024-04-18 LAB
AFP MOM: 0.44
AFP VALUE: 16.6 NG/ML
ALPHA FETOPROTEIN SERUM COMMENT: NORMAL
ALPHA FETOPROTEIN SERUM INTERPRETATION: NORMAL
ALPHA FETOPROTEIN SERUM RESULTS: NORMAL
ALPHA FETOPROTEIN SERUM TEST RESULTS: NORMAL
APPEARANCE: CLEAR
BILIRUBIN URINE: NEGATIVE
BLOOD URINE: NEGATIVE
COLOR: YELLOW
GESTATIONAL AGE BASED ON: NORMAL
GESTATIONAL AGE ON COLLECTION DATE: 16.1 WEEKS
GLUCOSE QUALITATIVE U: NEGATIVE
INSULIN DEP DIABETES: NO
KETONES URINE: NEGATIVE
LEUKOCYTE ESTERASE URINE: ABNORMAL
MATERNAL AGE AT EDD AFP: 23.6 YR
MULTIPLE GESTATION: NO
NITRITE URINE: NEGATIVE
OSBR RISK 1 IN: NORMAL
PH URINE: 7
PROTEIN URINE: NEGATIVE
RACE: NORMAL
SPECIFIC GRAVITY URINE: >=1.03
UROBILINOGEN URINE: 1 (ref 0.2–?)
WEIGHT AFP: 128 LBS

## 2024-05-01 ENCOUNTER — RX RENEWAL (OUTPATIENT)
Age: 24
End: 2024-05-01

## 2024-05-01 RX ORDER — CHLORHEXIDINE GLUCONATE 4 %
325 (65 FE) LIQUID (ML) TOPICAL
Qty: 90 | Refills: 0 | Status: ACTIVE | COMMUNITY
Start: 2024-01-30 | End: 1900-01-01

## 2024-05-07 ENCOUNTER — APPOINTMENT (OUTPATIENT)
Dept: ANTEPARTUM | Facility: CLINIC | Age: 24
End: 2024-05-07
Payer: MEDICAID

## 2024-05-07 ENCOUNTER — NON-APPOINTMENT (OUTPATIENT)
Age: 24
End: 2024-05-07

## 2024-05-07 ENCOUNTER — ASOB RESULT (OUTPATIENT)
Age: 24
End: 2024-05-07

## 2024-05-07 PROCEDURE — 76817 TRANSVAGINAL US OBSTETRIC: CPT

## 2024-05-07 PROCEDURE — 76811 OB US DETAILED SNGL FETUS: CPT

## 2024-05-14 ENCOUNTER — APPOINTMENT (OUTPATIENT)
Dept: ANTEPARTUM | Facility: CLINIC | Age: 24
End: 2024-05-14
Payer: MEDICAID

## 2024-05-14 ENCOUNTER — ASOB RESULT (OUTPATIENT)
Age: 24
End: 2024-05-14

## 2024-05-14 PROCEDURE — 76816 OB US FOLLOW-UP PER FETUS: CPT

## 2024-05-23 ENCOUNTER — NON-APPOINTMENT (OUTPATIENT)
Age: 24
End: 2024-05-23

## 2024-05-23 ENCOUNTER — APPOINTMENT (OUTPATIENT)
Dept: OBGYN | Facility: CLINIC | Age: 24
End: 2024-05-23
Payer: MEDICAID

## 2024-05-23 VITALS
SYSTOLIC BLOOD PRESSURE: 106 MMHG | DIASTOLIC BLOOD PRESSURE: 60 MMHG | WEIGHT: 134 LBS | HEIGHT: 62 IN | BODY MASS INDEX: 24.66 KG/M2

## 2024-05-23 LAB
APPEARANCE: CLEAR
BILIRUBIN URINE: NEGATIVE
BLOOD URINE: NEGATIVE
COLOR: YELLOW
GLUCOSE QUALITATIVE U: NEGATIVE
KETONES URINE: NEGATIVE
LEUKOCYTE ESTERASE URINE: ABNORMAL
NITRITE URINE: NEGATIVE
PH URINE: 7
PROTEIN URINE: NEGATIVE
SPECIFIC GRAVITY URINE: 1.02
UROBILINOGEN URINE: 0.2 (ref 0.2–?)

## 2024-05-23 PROCEDURE — 0502F SUBSEQUENT PRENATAL CARE: CPT

## 2024-06-19 ENCOUNTER — NON-APPOINTMENT (OUTPATIENT)
Age: 24
End: 2024-06-19

## 2024-06-19 ENCOUNTER — APPOINTMENT (OUTPATIENT)
Dept: OBGYN | Facility: CLINIC | Age: 24
End: 2024-06-19
Payer: MEDICAID

## 2024-06-19 VITALS
BODY MASS INDEX: 25.69 KG/M2 | SYSTOLIC BLOOD PRESSURE: 120 MMHG | HEIGHT: 62 IN | WEIGHT: 139.6 LBS | DIASTOLIC BLOOD PRESSURE: 60 MMHG

## 2024-06-19 DIAGNOSIS — Z34.92 ENCOUNTER FOR SUPERVISION OF NORMAL PREGNANCY, UNSPECIFIED, SECOND TRIMESTER: ICD-10-CM

## 2024-06-19 LAB
APPEARANCE: CLEAR
BILIRUBIN URINE: ABNORMAL
BLOOD URINE: NEGATIVE
COLOR: YELLOW
GLUCOSE QUALITATIVE U: NEGATIVE
KETONES URINE: ABNORMAL
LEUKOCYTE ESTERASE URINE: NEGATIVE
NITRITE URINE: NEGATIVE
PH URINE: 6.5
PROTEIN URINE: 30
SPECIFIC GRAVITY URINE: >=1.03
UROBILINOGEN URINE: 1 (ref 0.2–?)

## 2024-06-19 PROCEDURE — 0502F SUBSEQUENT PRENATAL CARE: CPT

## 2024-06-19 RX ORDER — ONDANSETRON 8 MG/1
8 TABLET ORAL
Qty: 45 | Refills: 0 | Status: ACTIVE | COMMUNITY
Start: 2024-02-07 | End: 1900-01-01

## 2024-06-21 LAB
GLUCOSE 1H P 50 G GLC PO SERPL-MCNC: 91 MG/DL
HCT VFR BLD CALC: 37.6 %
HGB BLD-MCNC: 12.4 G/DL
MCHC RBC-ENTMCNC: 30.2 PG
MCHC RBC-ENTMCNC: 33 GM/DL
MCV RBC AUTO: 91.5 FL
PLATELET # BLD AUTO: 189 K/UL
RBC # BLD: 4.11 M/UL
RBC # FLD: 13.7 %
WBC # FLD AUTO: 7.62 K/UL

## 2024-06-24 ENCOUNTER — EMERGENCY (EMERGENCY)
Facility: HOSPITAL | Age: 24
LOS: 1 days | End: 2024-06-24
Attending: EMERGENCY MEDICINE
Payer: MEDICAID

## 2024-06-24 VITALS
DIASTOLIC BLOOD PRESSURE: 64 MMHG | OXYGEN SATURATION: 97 % | SYSTOLIC BLOOD PRESSURE: 108 MMHG | RESPIRATION RATE: 18 BRPM | HEART RATE: 105 BPM | TEMPERATURE: 98 F

## 2024-06-24 PROCEDURE — 87637 SARSCOV2&INF A&B&RSV AMP PRB: CPT

## 2024-06-24 PROCEDURE — 99284 EMERGENCY DEPT VISIT MOD MDM: CPT

## 2024-06-24 PROCEDURE — 99283 EMERGENCY DEPT VISIT LOW MDM: CPT

## 2024-06-24 RX ORDER — MAGNESIUM, ALUMINUM HYDROXIDE 400-400
30 TABLET,CHEWABLE ORAL ONCE
Refills: 0 | Status: COMPLETED | OUTPATIENT
Start: 2024-06-24 | End: 2024-06-24

## 2024-06-24 RX ORDER — ACETAMINOPHEN 325 MG
650 TABLET ORAL ONCE
Refills: 0 | Status: COMPLETED | OUTPATIENT
Start: 2024-06-24 | End: 2024-06-24

## 2024-06-24 RX ADMIN — Medication 650 MILLIGRAM(S): at 23:12

## 2024-06-24 RX ADMIN — Medication 30 MILLILITER(S): at 23:12

## 2024-06-25 ENCOUNTER — NON-APPOINTMENT (OUTPATIENT)
Age: 24
End: 2024-06-25

## 2024-06-25 LAB
FLUAV AG NPH QL: DETECTED
FLUBV AG NPH QL: SIGNIFICANT CHANGE UP
RSV RNA NPH QL NAA+NON-PROBE: SIGNIFICANT CHANGE UP
SARS-COV-2 RNA SPEC QL NAA+PROBE: SIGNIFICANT CHANGE UP

## 2024-06-25 RX ORDER — CASANTHRANOL/DOCUSATE SODIUM
1 SYRUP ORAL
Qty: 10 | Refills: 0
Start: 2024-06-25 | End: 2024-06-29

## 2024-07-18 ENCOUNTER — NON-APPOINTMENT (OUTPATIENT)
Age: 24
End: 2024-07-18

## 2024-07-23 ENCOUNTER — APPOINTMENT (OUTPATIENT)
Dept: OBGYN | Facility: CLINIC | Age: 24
End: 2024-07-23
Payer: MEDICAID

## 2024-07-23 DIAGNOSIS — Z34.93 ENCOUNTER FOR SUPERVISION OF NORMAL PREGNANCY, UNSPECIFIED, THIRD TRIMESTER: ICD-10-CM

## 2024-07-23 PROCEDURE — 0502F SUBSEQUENT PRENATAL CARE: CPT

## 2024-07-25 PROBLEM — Z34.93 THIRD TRIMESTER PREGNANCY: Status: ACTIVE | Noted: 2024-07-25

## 2024-07-25 LAB
APPEARANCE: CLEAR
BILIRUBIN URINE: ABNORMAL
BLOOD URINE: NEGATIVE
COLOR: YELLOW
GLUCOSE QUALITATIVE U: NEGATIVE
KETONES URINE: NEGATIVE
LEUKOCYTE ESTERASE URINE: ABNORMAL
NITRITE URINE: NEGATIVE
PH URINE: 7
PROTEIN URINE: 30
SPECIFIC GRAVITY URINE: 1.02
UROBILINOGEN URINE: 2 (ref 0.2–?)

## 2024-07-30 ENCOUNTER — APPOINTMENT (OUTPATIENT)
Dept: ANTEPARTUM | Facility: CLINIC | Age: 24
End: 2024-07-30
Payer: MEDICAID

## 2024-07-30 ENCOUNTER — ASOB RESULT (OUTPATIENT)
Age: 24
End: 2024-07-30

## 2024-07-30 PROCEDURE — 76819 FETAL BIOPHYS PROFIL W/O NST: CPT

## 2024-07-30 PROCEDURE — 76816 OB US FOLLOW-UP PER FETUS: CPT

## 2024-08-06 ENCOUNTER — NON-APPOINTMENT (OUTPATIENT)
Age: 24
End: 2024-08-06

## 2024-08-06 ENCOUNTER — APPOINTMENT (OUTPATIENT)
Dept: OBGYN | Facility: CLINIC | Age: 24
End: 2024-08-06

## 2024-08-13 ENCOUNTER — APPOINTMENT (OUTPATIENT)
Dept: OBGYN | Facility: CLINIC | Age: 24
End: 2024-08-13
Payer: MEDICAID

## 2024-08-13 ENCOUNTER — NON-APPOINTMENT (OUTPATIENT)
Age: 24
End: 2024-08-13

## 2024-08-13 VITALS
HEIGHT: 62 IN | BODY MASS INDEX: 25.99 KG/M2 | WEIGHT: 141.25 LBS | SYSTOLIC BLOOD PRESSURE: 102 MMHG | DIASTOLIC BLOOD PRESSURE: 60 MMHG

## 2024-08-13 DIAGNOSIS — Z23 ENCOUNTER FOR IMMUNIZATION: ICD-10-CM

## 2024-08-13 DIAGNOSIS — Z34.93 ENCOUNTER FOR SUPERVISION OF NORMAL PREGNANCY, UNSPECIFIED, THIRD TRIMESTER: ICD-10-CM

## 2024-08-13 PROCEDURE — 0502F SUBSEQUENT PRENATAL CARE: CPT

## 2024-08-13 PROCEDURE — 90715 TDAP VACCINE 7 YRS/> IM: CPT

## 2024-08-13 PROCEDURE — 90471 IMMUNIZATION ADMIN: CPT

## 2024-08-14 LAB
APPEARANCE: CLEAR
BILIRUBIN URINE: NEGATIVE
BLOOD URINE: NEGATIVE
COLOR: YELLOW
GLUCOSE QUALITATIVE U: NEGATIVE
KETONES URINE: NEGATIVE
LEUKOCYTE ESTERASE URINE: ABNORMAL
NITRITE URINE: NEGATIVE
PH URINE: 7
PROTEIN URINE: 30
SPECIFIC GRAVITY URINE: 1.02
UROBILINOGEN URINE: 1 (ref 0.2–?)

## 2024-08-27 ENCOUNTER — NON-APPOINTMENT (OUTPATIENT)
Age: 24
End: 2024-08-27

## 2024-08-27 ENCOUNTER — APPOINTMENT (OUTPATIENT)
Dept: OBGYN | Facility: CLINIC | Age: 24
End: 2024-08-27
Payer: MEDICAID

## 2024-08-27 VITALS
BODY MASS INDEX: 25.76 KG/M2 | DIASTOLIC BLOOD PRESSURE: 68 MMHG | WEIGHT: 140 LBS | HEIGHT: 62 IN | SYSTOLIC BLOOD PRESSURE: 98 MMHG

## 2024-08-27 DIAGNOSIS — Z11.3 ENCOUNTER FOR SCREENING FOR INFECTIONS WITH A PREDOMINANTLY SEXUAL MODE OF TRANSMISSION: ICD-10-CM

## 2024-08-27 DIAGNOSIS — Z34.93 ENCOUNTER FOR SUPERVISION OF NORMAL PREGNANCY, UNSPECIFIED, THIRD TRIMESTER: ICD-10-CM

## 2024-08-27 LAB
APPEARANCE: CLEAR
BILIRUBIN URINE: NEGATIVE
BLOOD URINE: NEGATIVE
COLOR: YELLOW
GLUCOSE QUALITATIVE U: NEGATIVE
KETONES URINE: NEGATIVE
LEUKOCYTE ESTERASE URINE: ABNORMAL
NITRITE URINE: NEGATIVE
PH URINE: 7
PROTEIN URINE: NEGATIVE
SPECIFIC GRAVITY URINE: 1.02
UROBILINOGEN URINE: 1 (ref 0.2–?)

## 2024-08-27 PROCEDURE — 0502F SUBSEQUENT PRENATAL CARE: CPT

## 2024-08-28 LAB
HCV AB SER QL: NONREACTIVE
HCV S/CO RATIO: 0.13 S/CO
HIV1+2 AB SPEC QL IA.RAPID: NONREACTIVE

## 2024-08-29 LAB
B-HEM STREP SPEC QL CULT: NORMAL
T PALLIDUM AB SER QL IA: NEGATIVE

## 2024-08-30 LAB
GP B STREP DNA SPEC QL NAA+PROBE: NOT DETECTED
SOURCE GBS: NORMAL

## 2024-09-10 ENCOUNTER — NON-APPOINTMENT (OUTPATIENT)
Age: 24
End: 2024-09-10

## 2024-09-10 ENCOUNTER — APPOINTMENT (OUTPATIENT)
Dept: OBGYN | Facility: CLINIC | Age: 24
End: 2024-09-10
Payer: MEDICAID

## 2024-09-10 VITALS
DIASTOLIC BLOOD PRESSURE: 58 MMHG | HEIGHT: 62 IN | WEIGHT: 141 LBS | BODY MASS INDEX: 25.95 KG/M2 | SYSTOLIC BLOOD PRESSURE: 100 MMHG

## 2024-09-10 DIAGNOSIS — Z34.93 ENCOUNTER FOR SUPERVISION OF NORMAL PREGNANCY, UNSPECIFIED, THIRD TRIMESTER: ICD-10-CM

## 2024-09-10 PROCEDURE — 0502F SUBSEQUENT PRENATAL CARE: CPT

## 2024-09-12 LAB
APPEARANCE: CLEAR
BILIRUBIN URINE: NEGATIVE
BLOOD URINE: NEGATIVE
COLOR: YELLOW
GLUCOSE QUALITATIVE U: NEGATIVE
KETONES URINE: NEGATIVE
LEUKOCYTE ESTERASE URINE: ABNORMAL
NITRITE URINE: NEGATIVE
PH URINE: 7
PROTEIN URINE: ABNORMAL
SPECIFIC GRAVITY URINE: 1.02
UROBILINOGEN URINE: 1 (ref 0.2–?)

## 2024-09-16 ENCOUNTER — INPATIENT (INPATIENT)
Facility: HOSPITAL | Age: 24
LOS: 0 days | Discharge: HOME | DRG: 833 | End: 2024-09-17
Attending: STUDENT IN AN ORGANIZED HEALTH CARE EDUCATION/TRAINING PROGRAM | Admitting: STUDENT IN AN ORGANIZED HEALTH CARE EDUCATION/TRAINING PROGRAM
Payer: MEDICAID

## 2024-09-16 VITALS — OXYGEN SATURATION: 100 % | SYSTOLIC BLOOD PRESSURE: 103 MMHG | HEART RATE: 83 BPM | DIASTOLIC BLOOD PRESSURE: 63 MMHG

## 2024-09-16 DIAGNOSIS — O26.899 OTHER SPECIFIED PREGNANCY RELATED CONDITIONS, UNSPECIFIED TRIMESTER: ICD-10-CM

## 2024-09-16 DIAGNOSIS — O26.893 OTHER SPECIFIED PREGNANCY RELATED CONDITIONS, THIRD TRIMESTER: ICD-10-CM

## 2024-09-16 LAB
BASOPHILS # BLD AUTO: 0.01 K/UL — SIGNIFICANT CHANGE UP (ref 0–0.2)
BASOPHILS NFR BLD AUTO: 0.1 % — SIGNIFICANT CHANGE UP (ref 0–2)
BLD GP AB SCN SERPL QL: SIGNIFICANT CHANGE UP
EOSINOPHIL # BLD AUTO: 0.12 K/UL — SIGNIFICANT CHANGE UP (ref 0–0.5)
EOSINOPHIL NFR BLD AUTO: 1.4 % — SIGNIFICANT CHANGE UP (ref 0–6)
HCT VFR BLD CALC: 35 % — SIGNIFICANT CHANGE UP (ref 34.5–45)
HGB BLD-MCNC: 11.4 G/DL — LOW (ref 11.5–15.5)
IMM GRANULOCYTES NFR BLD AUTO: 0.6 % — SIGNIFICANT CHANGE UP (ref 0–0.9)
LYMPHOCYTES # BLD AUTO: 1.84 K/UL — SIGNIFICANT CHANGE UP (ref 1–3.3)
LYMPHOCYTES # BLD AUTO: 21.3 % — SIGNIFICANT CHANGE UP (ref 13–44)
MCHC RBC-ENTMCNC: 28.4 PG — SIGNIFICANT CHANGE UP (ref 27–34)
MCHC RBC-ENTMCNC: 32.6 GM/DL — SIGNIFICANT CHANGE UP (ref 32–36)
MCV RBC AUTO: 87.1 FL — SIGNIFICANT CHANGE UP (ref 80–100)
MONOCYTES # BLD AUTO: 0.65 K/UL — SIGNIFICANT CHANGE UP (ref 0–0.9)
MONOCYTES NFR BLD AUTO: 7.5 % — SIGNIFICANT CHANGE UP (ref 2–14)
NEUTROPHILS # BLD AUTO: 5.96 K/UL — SIGNIFICANT CHANGE UP (ref 1.8–7.4)
NEUTROPHILS NFR BLD AUTO: 69.1 % — SIGNIFICANT CHANGE UP (ref 43–77)
PLATELET # BLD AUTO: 170 K/UL — SIGNIFICANT CHANGE UP (ref 150–400)
RBC # BLD: 4.02 M/UL — SIGNIFICANT CHANGE UP (ref 3.8–5.2)
RBC # FLD: 13.5 % — SIGNIFICANT CHANGE UP (ref 10.3–14.5)
WBC # BLD: 8.63 K/UL — SIGNIFICANT CHANGE UP (ref 3.8–10.5)
WBC # FLD AUTO: 8.63 K/UL — SIGNIFICANT CHANGE UP (ref 3.8–10.5)

## 2024-09-16 PROCEDURE — 59400 OBSTETRICAL CARE: CPT | Mod: U7

## 2024-09-16 RX ORDER — ACETAMINOPHEN 325 MG/1
975 TABLET ORAL
Refills: 0 | Status: DISCONTINUED | OUTPATIENT
Start: 2024-09-16 | End: 2024-09-17

## 2024-09-16 RX ORDER — TETANUS TOXOID, REDUCED DIPHTHERIA TOXOID AND ACELLULAR PERTUSSIS VACCINE, ADSORBED 5; 2.5; 8; 8; 2.5 [IU]/.5ML; [IU]/.5ML; UG/.5ML; UG/.5ML; UG/.5ML
0.5 SUSPENSION INTRAMUSCULAR ONCE
Refills: 0 | Status: DISCONTINUED | OUTPATIENT
Start: 2024-09-16 | End: 2024-09-17

## 2024-09-16 RX ORDER — IBUPROFEN 600 MG
600 TABLET ORAL EVERY 6 HOURS
Refills: 0 | Status: COMPLETED | OUTPATIENT
Start: 2024-09-16 | End: 2025-08-15

## 2024-09-16 RX ORDER — PRAMOXINE HCL 1 %
1 GEL (GRAM) TOPICAL EVERY 4 HOURS
Refills: 0 | Status: DISCONTINUED | OUTPATIENT
Start: 2024-09-16 | End: 2024-09-17

## 2024-09-16 RX ORDER — FLU VACCINE TS 2012-2013(5YR+) 45MCG/.5ML
0.5 VIAL (ML) INTRAMUSCULAR ONCE
Refills: 0 | Status: DISCONTINUED | OUTPATIENT
Start: 2024-09-16 | End: 2024-09-17

## 2024-09-16 RX ORDER — VITAMIN A, ASCORBIC ACID, VITAMIN D, .ALPHA.-TOCOPHEROL, THIAMINE MONONITRATE, RIBOFLAVIN, NIACIN, PYRIDOXINE HYDROCHLORIDE, FOLIC ACID, CYANOCOBALAMIN, CALCIUM, IRON, MAGNESIUM, ZINC, AND COPPER 2700; 70; 400; 30; 1.6; 1.8; 18; 2.5; 1; 12; 100; 65; 25; 25; 2 [IU]/1; MG/1; [IU]/1; [IU]/1; MG/1; MG/1; MG/1; MG/1; MG/1; UG/1; MG/1; MG/1; MG/1; MG/1; MG/1
1 TABLET ORAL DAILY
Refills: 0 | Status: DISCONTINUED | OUTPATIENT
Start: 2024-09-16 | End: 2024-09-17

## 2024-09-16 RX ORDER — DIPHENHYDRAMINE HCL 50 MG
25 CAPSULE ORAL EVERY 6 HOURS
Refills: 0 | Status: DISCONTINUED | OUTPATIENT
Start: 2024-09-16 | End: 2024-09-17

## 2024-09-16 RX ORDER — HYDROCORTISONE 1 %
1 OINTMENT (GRAM) TOPICAL EVERY 6 HOURS
Refills: 0 | Status: DISCONTINUED | OUTPATIENT
Start: 2024-09-16 | End: 2024-09-17

## 2024-09-16 RX ORDER — SODIUM CITRATE AND CITRIC ACID MONOHYDRATE 334; 500 MG/5ML; MG/5ML
30 SOLUTION ORAL ONCE
Refills: 0 | Status: DISCONTINUED | OUTPATIENT
Start: 2024-09-16 | End: 2024-09-16

## 2024-09-16 RX ORDER — KETOROLAC TROMETHAMINE 30 MG/ML
30 INJECTION, SOLUTION INTRAMUSCULAR ONCE
Refills: 0 | Status: DISCONTINUED | OUTPATIENT
Start: 2024-09-16 | End: 2024-09-16

## 2024-09-16 RX ORDER — WITCH HAZEL 1 G/ML
1 LIQUID TOPICAL EVERY 4 HOURS
Refills: 0 | Status: DISCONTINUED | OUTPATIENT
Start: 2024-09-16 | End: 2024-09-17

## 2024-09-16 RX ORDER — ONDANSETRON 2 MG/ML
8 INJECTION, SOLUTION INTRAMUSCULAR; INTRAVENOUS EVERY 8 HOURS
Refills: 0 | Status: DISCONTINUED | OUTPATIENT
Start: 2024-09-16 | End: 2024-09-17

## 2024-09-16 RX ORDER — MENTHOL/CETYLPYRD CL 3 MG
1 LOZENGE MUCOUS MEMBRANE EVERY 6 HOURS
Refills: 0 | Status: DISCONTINUED | OUTPATIENT
Start: 2024-09-16 | End: 2024-09-17

## 2024-09-16 RX ORDER — OXYTOCIN 10 UNIT/ML
666 AMPUL (ML) INJECTION
Qty: 30 | Refills: 0 | Status: DISCONTINUED | OUTPATIENT
Start: 2024-09-16 | End: 2024-09-17

## 2024-09-16 RX ORDER — LANOLIN
1 OINTMENT (GRAM) TOPICAL EVERY 6 HOURS
Refills: 0 | Status: DISCONTINUED | OUTPATIENT
Start: 2024-09-16 | End: 2024-09-17

## 2024-09-16 RX ORDER — SODIUM CHLORIDE 9 MG/ML
3 INJECTION INTRAMUSCULAR; INTRAVENOUS; SUBCUTANEOUS EVERY 8 HOURS
Refills: 0 | Status: DISCONTINUED | OUTPATIENT
Start: 2024-09-16 | End: 2024-09-17

## 2024-09-16 RX ORDER — CHLORHEXIDINE GLUCONATE 40 MG/ML
1 SOLUTION TOPICAL DAILY
Refills: 0 | Status: DISCONTINUED | OUTPATIENT
Start: 2024-09-16 | End: 2024-09-16

## 2024-09-16 RX ORDER — OXYTOCIN 10 UNIT/ML
AMPUL (ML) INJECTION
Qty: 30 | Refills: 0 | Status: COMPLETED | OUTPATIENT
Start: 2024-09-16 | End: 2024-09-16

## 2024-09-16 RX ORDER — IBUPROFEN 600 MG
600 TABLET ORAL EVERY 6 HOURS
Refills: 0 | Status: DISCONTINUED | OUTPATIENT
Start: 2024-09-16 | End: 2024-09-17

## 2024-09-16 RX ADMIN — ACETAMINOPHEN 975 MILLIGRAM(S): 325 TABLET ORAL at 17:43

## 2024-09-16 RX ADMIN — Medication 125 MILLILITER(S): at 09:21

## 2024-09-16 RX ADMIN — Medication 167 MILLIUNIT(S)/MIN: at 14:15

## 2024-09-16 RX ADMIN — Medication 600 MILLIGRAM(S): at 21:57

## 2024-09-16 RX ADMIN — KETOROLAC TROMETHAMINE 30 MILLIGRAM(S): 30 INJECTION, SOLUTION INTRAMUSCULAR at 15:17

## 2024-09-16 NOTE — OB RN PATIENT PROFILE - THE IMPORTANCE OF THE CORRECT PLACEMENT OF THE INFANT AND THE PARENT’S ABILITY TO ASSESS THE INFANT'S SKIN COLOR WHILE PLACED ON SKIN TO SKIN HAS BEEN REINFORCED.
Patient's wife called back (oral disclosure on file) and informed.  Appointment scheduled.  Refilled per standing orders with no refills.  
Patients wife left a message requesting refill of metoprolol. Left message for her to call back. Patient needs to schedule a follow up visit and pharmacy choice is needed.     Refill request received by patients wife for metoprolol 25mg.   Last visit: 01/31/19  Next appointment with Dr SAMANTHA Epperson: none    Refill pending per MD standing orders.      
Statement Selected

## 2024-09-16 NOTE — OB RN DELIVERY SUMMARY - NSSELHIDDEN_OBGYN_ALL_OB_FT
[NS_DeliveryAttending1_OBGYN_ALL_OB_FT:[id number]],[NS_DeliveryAssist1_OBGYN_ALL_OB_FT:[id number]],[NS_DeliveryRN_OBGYN_ALL_OB_FT:[id number]]

## 2024-09-16 NOTE — DISCHARGE NOTE OB - MATERIALS PROVIDED
Breastfeeding Log/Breastfeeding Mother’s Support Group Information/Guide to Postpartum Care/[facility] Hearing Screen Program/Back To Sleep Handout/Shaken Baby Prevention Handout/Breastfeeding Guide and Packet/Birth Certificate Instructions/Tdap Vaccination (VIS Pub Date: January 24, 2012)

## 2024-09-16 NOTE — OB RN PATIENT PROFILE - FALL HARM RISK - UNIVERSAL INTERVENTIONS
Bed in lowest position, wheels locked, appropriate side rails in place/Call bell, personal items and telephone in reach/Instruct patient to call for assistance before getting out of bed or chair/Non-slip footwear when patient is out of bed/[place] to call system/Physically safe environment - no spills, clutter or unnecessary equipment/Purposeful Proactive Rounding/Room/bathroom lighting operational, light cord in reach

## 2024-09-16 NOTE — OB PROVIDER LABOR PROGRESS NOTE - ASSESSMENT
23 y/o  @ 38+6 in labor   - cat II - maternal resuscitation   - feels OP - plan for peanut ball   - will continue to closely monitor    Sarita Roman MD
FHT Cat 1  AROMed forebag, clear fluid  Continue with expectant management
Cat 1 FHT  Patient progressing in labor  Continue expectant management  Will reexamine when feeling increased pressure

## 2024-09-16 NOTE — DISCHARGE NOTE OB - IF YOU ARE BREASTFEEDING, WARM SOAKS, BREAST MASSAGE AND FREQUENT FEEDINGS TO INITIATE LET-DOWN AND/OR ALLEVIATE DISCOMFORT FROM FIRM (ENGORGED) BREASTS
Anesthesia ROS/Med Hx    Overall Review:  Pts. EKG was reviewed and Pts.echo was reviewed   Pt. has no history of anesthetic complications  Pt. does not have difficult airway    Pulmonary Review:    Pt. positive for COPD   Pt. positive for asthma    Cardiovascular Review:    Pt. positive for hypertension  Pt. positive for hyperlipidemia    GI/HEPATIC/RENAL Review:    Pt. positive for GERD  Pt. positive for liver disease    End/Other Review:    Pt. positive for diabetes  Pt. positive for obesity     Anesthesia Plan  ASA Status: 3  Anesthesia Type: MAC  Induction: Intravenous  Reviewed: Lab Results, Pre-Induction Reassessment, Medications, Problem List, NPO Status, Patient Summary, Nursing Notes, EKG, Past Med History, Allergies and Beta Blocker Status  The proposed anesthetic plan, including its risks and benefits, have been discussed with the Patient - along with the risks and benefits of alternatives.  Questions were encouraged and answered and the patient and/or representative agrees to proceed.  Blood Products: Not Anticipated      Physical Exam  Mallampati: III  TM Distance: >3 FB  Neck ROM: Full  Cardio Rhythm: Irregular  Cardio Rate: Abnormal  Patient Demonstrates:  Decreased Breath Sounds  Patient Demonstrates:  Obese  dental exam normal       Statement Selected

## 2024-09-16 NOTE — OB PROVIDER LABOR PROGRESS NOTE - NS_OBIHIFHRDETAILS_OBGYN_ALL_OB_FT
baseline 110, moderate variability, +accels, -decels
baseline 115, mod variability, +accels, no decels
cat II with early and variable decelerations

## 2024-09-16 NOTE — OB PROVIDER H&P - NSLOWPPHRISK_OBGYN_A_OB
No previous uterine incision/[place] Pregnancy/Less than or equal to 4 previous vaginal births/No known bleeding disorder/No history of postpartum hemorrhage

## 2024-09-16 NOTE — OB PROVIDER DELIVERY SUMMARY - NSLOWPPHRISK_OBGYN_A_OB
No previous uterine incision/[place] Pregnancy/Less than or equal to 4 previous vaginal births/No known bleeding disorder/No history of postpartum hemorrhage No previous uterine incision/[place] Pregnancy/Less than or equal to 4 previous vaginal births/No known bleeding disorder/No history of postpartum hemorrhage/No other PPH risks indicated

## 2024-09-16 NOTE — OB PROVIDER DELIVERY SUMMARY - NSSELHIDDEN_OBGYN_ALL_OB_FT
[[email or web address]_OBGYN_ALL_OB_FT:[email or web address]=],[NS_DeliveryAssist1_OBGYN_ALL_OB_FT:[email or web address]=],[NS_DeliveryRN_OBGYN_ALL_OB_FT:[id number]=]

## 2024-09-16 NOTE — OB PROVIDER H&P - ASSESSMENT
A/P: 24y  at 39w GA by LMP who presents to L&D with SROM @ 3am and regular painful CTX.     -Admit to L&D  -Consent  -Admission labs  -IV fluids  -Labor: SROM. Latent labor. [place] q8.    -Fetus: Cat I tracing. Continuous toco and fetal monitoring.   -GBS: Negative, no GBS ppx required   -Analgesia: prn    Discussed with Dr. Martin   A/P: 24y  at 39w GA by LMP who presents to L&D with SROM @ 3am and regular painful CTX.     -Admit to L&D  -Consent  -Admission labs  -IV fluids  -Labor: SROM. Latent labor. [place] q8.    - VTX  -Fetus: Cat I tracing. Continuous toco and fetal monitoring.   -GBS: Negative, no GBS ppx required   -Analgesia: prn    Discussed with Dr. Martin

## 2024-09-16 NOTE — DISCHARGE NOTE OB - MEDICATION SUMMARY - MEDICATIONS TO TAKE
I will START or STAY ON the medications listed below when I get home from the hospital:    ibuprofen 600 mg oral tablet  -- 1 tab(s) by mouth every 6 hours  -- Indication: For pain    Tylenol 325 mg oral tablet  -- 2 tab(s) by mouth every 6 hours  -- Indication: For pain    Prenatal Multivitamins with Folic Acid 1 mg oral tablet  -- 1 tab(s) by mouth once a day  -- Indication: For Nutirtion

## 2024-09-16 NOTE — DISCHARGE NOTE OB - PATIENT PORTAL LINK FT
You can access the FollowMyHealth Patient Portal offered by [facility] by registering at the following website: [email or web address]. By joining [employer]’s FollowMyHealth portal, you will also be able to view your health information using other applications (apps) compatible with our system.

## 2024-09-16 NOTE — OB RN PATIENT PROFILE - NS_GESTAGE_OBGYN_ALL_OB_FT
After Visit Summary   11/13/2018    Felipa Cyr    MRN: [id number]           Patient Information     Date Of Birth          1983        Visit Information        Provider Department      11/13/2018 4:00 PM Jodi Joel MD [facility] [place]        Today's Diagnoses     History of NIKA positive for herpes simplex virus    -  1    Supervision of normal intrauterine pregnancy in multigravida in third trimester           Follow-ups after your visit        Follow-up notes from your care team     Return in about 1 week (around 11/20/2018).      Your next 10 appointments already scheduled     Nov 19, 2018  4:00 PM CST   ESTABLISHED PRENATAL with Jodi Joel MD   [facility] [place] ([facility] [place])    [address] [place] [address] [address]  [place] [address]   [phone]            Nov 27, 2018  4:00 PM CST   ESTABLISHED PRENATAL with Jodi Joel MD   [facility] [place] ([facility] [place])    [address] [place] [address] [address]  [place] [address]   [phone]            Dec 04, 2018  3:50 PM CST   ESTABLISHED PRENATAL with Jodi Joel MD   [facility] [place] ([facility] [place])    [address] [place] [address] [address]  [place] [address]   [phone]              Who to contact     If you have questions or need follow up information about today's clinic visit or your schedule please contact [facility] [place] directly at [phone].  Normal or non-critical lab and imaging results will be communicated to you by MyChart, letter or phone within 4 business days after the clinic has received the results. If you do not hear from us within 7 days, please contact the clinic through MyChart or phone. If you have a critical or abnormal lab result, we will notify you by phone as soon as possible.  Submit refill requests through  Vickie or call your pharmacy and they will forward the refill request to us. Please allow 3 business days for your refill to be completed.          Additional Information About Your Visit        Care EveryWhere ID     This is your Care EveryWhere ID. This could be used by other organizations to access your [facility] medical records  [id number]        Your Vitals Were     Last Period BMI (Body Mass Index)                02/24/2018 31.45 kg/m2           Blood Pressure from Last 3 Encounters:   11/13/18 97/60   11/06/18 110/62   10/23/18 110/70    Weight from Last 3 Encounters:   11/13/18 189 lb (85.7 kg)   11/06/18 188 lb 9.6 oz (85.5 kg)   10/23/18 185 lb (83.9 kg)              Today, you had the following     No orders found for display         Today's Medication Changes          These changes are accurate as of 11/13/18  4:59 PM.  If you have any questions, ask your nurse or doctor.               Start taking these medicines.        Dose/Directions    valACYclovir 1000 mg tablet   Commonly known as:  VALTREX   Used for:  History of NIKA positive for herpes simplex virus        Dose:  1000 mg   Take 1 tablet (1,000 mg) by mouth daily   Quantity:  30 tablet   Refills:  1            Where to get your medicines      These medications were sent to [facility] [address] [place] [address] [address] AT [address] [place] [address] & [address] [address]    Hours:  24-hours Phone:  [phone]     valACYclovir 1000 mg tablet                Primary Care Provider Office Phone # Fax #    Dimple Arredondo [phone]       [facility] [address] [address]        Equal Access to Services     [facility]: Hadii augie Mackay, wataniada luada, qaybta kaalmada violet, cailin spaulding. So [facility] [phone].    ATENCIÓN: Si habla español, tiene a jones disposición servicios gratuitos de asistencia lingüística. Llame al  [phone].    We comply with applicable federal civil rights laws and [place] laws. We do not discriminate on the basis of race, color, national origin, age, disability, sex, sexual orientation, or gender identity.            Thank you!     Thank you for choosing [facility] [place]  for your care. Our goal is always to provide you with excellent care. Hearing back from our patients is one way we can continue to improve our services. Please take a few minutes to complete the written survey that you may receive in the mail after your visit with us. Thank you!             Your Updated Medication List - Protect others around you: Learn how to safely use, store and throw away your medicines at www.disposemymeds.org.          This list is accurate as of 11/13/18  4:59 PM.  Always use your most recent med list.                   Brand Name Dispense Instructions for use Diagnosis    cephALEXin 500 MG capsule    KEFLEX    21 capsule    Take 1 capsule (500 mg) by mouth 3 times daily for 7 days    Bacteriuria during pregnancy       PRENATAL VITAMIN PO           valACYclovir 1000 mg tablet    VALTREX    30 tablet    Take 1 tablet (1,000 mg) by mouth daily    History of NIKA positive for herpes simplex virus          38w6d

## 2024-09-16 NOTE — DISCHARGE NOTE OB - HOSPITAL COURSE
Pt had uncomplicated vaginal delivery and was discharged home on postpartum day 2 after meeting all milestones

## 2024-09-16 NOTE — OB RN PATIENT PROFILE - LIVING CHILDREN, OB PROFILE
Patient is refusing to leave with [facility], wants to stay another night. Patient stated that he understands that he needs to go to rehab but does not want to leave today. MD notified and aware. Wife notified. Notified Ladonna LEIVA and spoke to RN, Jeanine Eli  that patient will not be going today. Per MD, will reassess tomorrow. 1

## 2024-09-16 NOTE — OB PROVIDER DELIVERY SUMMARY - NSPROVIDERDELIVERYNOTE_OBGYN_ALL_OB_FT
Vaginal Delivery Summary    Procedure: Normal spontaneous vaginal delivery   Findings: Viable female infant delivered in cephalic presentation at 1415, placenta delivered at 1418.  Apgar scores 9/9.   Weight will be recorded after 1 hour to allow for skin-to-skin contact.  Laceration(s): 1st degree perineal  Repair: 3-0 vicryl  EBL: 76  Complications: none    Procedure:   Patient felt rectal pressure and was found to be fully dilated, +2 station. She pushed effectively. She delivered a viable male infant. Shoulders delivered spontaneously and delayed cord clamping was performed for 60 seconds. Placenta delivered intact and pitocin was started at the delivery of infant. Perineum and vagina were inspected, 1st degree laceration present and repaired. Adequate hemostasis was obtained. Fundus was noted to be firm. Vaginal Delivery Summary    Procedure: Normal spontaneous vaginal delivery   Findings: Viable female infant delivered in cephalic presentation at 1415, placenta delivered at 1418.  Apgar scores 9/9.   Weight will be recorded after 1 hour to allow for skin-to-skin contact.  Laceration(s): 1st degree perineal  Repair: 3-0 vicryl  EBL: 76  Complications: none    Procedure:   Patient felt rectal pressure and was found to be fully dilated, +2 station. She pushed effectively. She delivered a viable male infant. Shoulders delivered spontaneously and delayed cord clamping was performed for 60 seconds. Placenta delivered intact and pitocin was started at the delivery of infant. Perineum and vagina were inspected, 1st degree laceration present and repaired. Adequate hemostasis was obtained. Fundus was noted to be firm.    [facility] OB attending:  resident note reviewed, uncomplicated vaginal delivery  Joleen Laughlin MD

## 2024-09-16 NOTE — OB PROVIDER H&P - HISTORY OF PRESENT ILLNESS
24y  at 39w GA by LMP who presents to L&D for rule out ROM. Patient noticed vaginal leakage at 0300 accompanied by painful contractions. Rested for an hour to check if CTX lessened but they remained at the same frequency and severity.     Patient denies vaginal bleeding. She endorses good fetal movement. Denies fevers, chills, nausea, vomiting, chest pain, SOB, dizziness and headache. No other complaints at this time.   ACOSTA:  2024  LMP: 2023    Prenatal course uncomplicated. Patient has been experiencing bouts of daily nausea, on Ondansetron 4mg dly + GERD on Omeprazole dly    POB: ,  on 2023, delivered at 38w after presenting in Labor.   PGYN: -fibroids, -ovarian cysts, denies STD hx, denies abnormal PAPs   PMH: Denies  PSH: Denies  SH: Denies EtOH, tobacco and illicit drug use during this pregnancy; feels safe at home   Meds: PNVs  Allergies: NKDA

## 2024-09-16 NOTE — OB RN TRIAGE NOTE - FALL HARM RISK - UNIVERSAL INTERVENTIONS
Bed in lowest position, wheels locked, appropriate side rails in place/Call bell, personal items and telephone in reach/Instruct patient to call for assistance before getting out of bed or chair/Non-slip footwear when patient is out of bed/[place] to call system/Physically safe environment - no spills, clutter or unnecessary equipment/Purposeful Proactive Rounding/Room/bathroom lighting operational, light cord in reach

## 2024-09-16 NOTE — DISCHARGE NOTE OB - CARE PROVIDER_API CALL
Moe Martin  Obstetrics and Gynecology  [address] [address]  Phone: [phone]  Fax: [phone]  Follow Up Time:

## 2024-09-16 NOTE — DISCHARGE NOTE OB - CARE PLAN
1 Principal Discharge DX:	Normal spontaneous vaginal delivery  Assessment and plan of treatment:	Patient underwent a normal spontaneous vaginal delivery. Post-partum course was uncomplicated. Pain is well controlled with PRN medication. She has no difficulty with ambulation, voiding, or PO intake. Lab values and vital signs are within normal limits prior to discharge.

## 2024-09-16 NOTE — OB RN PATIENT PROFILE - FUNCTIONAL ASSESSMENT - BASIC MOBILITY 4.
1. Need for vaccination  APRN Delegation - I have placed the below orders and discussed them with an approved delegating provider. The MA is performing the below orders under the direction of Sarath De Santiago MD Vaccine Information statements given for each vaccine if administered. Discussed benefits and side effects of each vaccine given with patient /family, answered all patient /family questions     - IINFLUENZA VACCINE QUAD INJ 6-35 MO. (PF)]   4 = No assist / stand by assistance

## 2024-09-16 NOTE — OB PROVIDER H&P - ATTENDING COMMENTS
24y  at 39w GA by LMP who presents to L&D with SROM @ 3am and regular painful CTX.   - consenting included following discussions:   1. patient informed of reason for admission   2. modes and options of induction (prostaglandins, balloon, artificial rupture of membranes, and membrane sweeping) -- all explained in terms of mechanism of action/risks/benefits/alternatives  3. discussed antibiotic coverage for either GBS or in case of concern for chorioamnionitis   4. pain medication options/use/indications discussed, including Stadol IV vs Epidural (neuraxial analgesia)   5. discussed management of labor with pitocin if inadequate contraction pattern/no change  6. discussed expectations for vaginal delivery and indications for  (fetal distress, failed induction, arrest)  7. discussed blood transfusion in case of emergency, patient agreeable   8. discussed all possible procedural interventions including (AROM, internal fetal monitoring, instrumental/vaccuum assisted delivery)   9. discussed typical intrapartum/postpartum medication interventions including uterotonics, antihypertensives, anti-emetics, and pain control medications   Patient verbalized understanding of above points and agreement with above plan, consent signed.     - admission labs   - FHT: reactive and reassuring   - Merna: with irregular activity   - vertex   - GBS negative, no antibiotic prophylaxis indicated   - mild discomfort, does not currently desire analgesia   - continuous EFM/Merna until delivery   - vaginal delivery anticipated   - expectant management, will augment if clinically indicated

## 2024-09-16 NOTE — DISCHARGE NOTE OB - SWOLLEN AREA ON THE LEG THAT IS PAINFUL, RED OR HOT
10/24/19 1221   Post-Acute Status   Post-Acute Authorization Placement;Home Health/Hospice  (Home)   Post-Acute Placement Status Set-up Complete   Home Health/Hospice Status Set-up Complete      Statement Selected

## 2024-09-16 NOTE — OB RN TRIAGE NOTE - NS_PRENATALSTART_OBGYN_ALL_OB
Bedside and Verbal shift change report given to [employer] (oncoming nurse) by BETZY Gonzalez RN (offgoing nurse). Report given with SBAR, Kardex, Intake/Output, MAR and Recent Results. Started first trimester

## 2024-09-16 NOTE — OB PROVIDER H&P - NSHPPHYSICALEXAM_GEN_ALL_CORE
T(C): --  HR: 80 (09-16-24 @ 06:28) (79 - 90)  BP: 103/- (09-16-24 @ 05:49) (103/- - 103/63)  RR: 16  SpO2: 99% (09-16-24 @ 06:23) (99% - 100%)    Gen: NAD, well-appearing, AAOx3   Abd: Soft, gravid  Ext: non-tender, non-edematous  SSE: Pooling of fluid in the vaginal vault. Nitrazine test positive  SVE:  2/40/-3  Bedside sono:  FHT: 115bpm, moderate variability, accels, no decels   [place]: q8 Patient is a 71 yo female with pmhx of colon cancer s/p hemicolectomy, liver metastasis, HLD, HTN, anxiety, depression, osteoporosis, oophorectomy who presented with hypoxia, tachycardia, and failure to thrive. Patient presented with daughter at bedside. Per daughter, patient found to be hypoxic at home with O2 saturation in the 70s, and tachycardia HR in the 110s at home for the past day. Was told by oncologist to bring patient to the ER for evaluation. Patient also complains of vague RLQ intermittent abdominal pain for past few weeks. Per daughter, patient's last chemo treatment was 10/15/17. Patient has poor appetite for the past week, recently started on medicinal marijuana. At baseline at home, patient able to sit in chair and watch TV, and ambulate to bathroom with assistance from daughter or . Patient also complain of lower back pain associated with bedsore. Daughter brought patient to urgent care yesterday and xray found lung metastasis. Daughter stated that patient has dark, malodorous urine at home, but UA result at her oncologist yesterday was negative. Currently, patient able to breathe better on NC, and is resting in bed. Denies fever, chills, chest pain, palpitations, cough, nausea, vomiting, diarrhea, constipation, urinary frequency, urgency, or dysuria, headaches, changes in vision, dizziness, numbness, or tingling.    In the ER, patient's is afebrile, 99bpm, 124/58, and 100% O2 on NC. Patient found to have elevated WBC 23.3, low H/H 9.3/30.7. Elevated Alk phos 969, and elevated lactate 2.7. Patient also has elevated , ProBNP 1378. CTA showed no pulmonary embolism, worsening of mediastinal/hilar lymphadenopathy and diffuse pulmonary metastatic disease, worsening of liver metastases, small amount of ascites, small nodule along the peritoneal thickening in the right pelvis may represent carcinomatosis. EKG showed normal SR, prolonged QT.

## 2024-09-16 NOTE — OB RN DELIVERY SUMMARY - NS_SEPSISRSKCALC_OBGYN_ALL_OB_FT
EOS calculated successfully. EOS Risk Factor: 0.5/1000 live births ([facility] national incidence); GA=38w6d; Temp=98.6; ROM=11.25; GBS='Negative'; Antibiotics='No antibiotics or any antibiotics < 2 hrs prior to birth'

## 2024-09-16 NOTE — OB RN PATIENT PROFILE - VISION (WITH CORRECTIVE LENSES IF THE PATIENT USUALLY WEARS THEM):
Chief Complaint   Patient presents with   • Back Pain     On 7/6/20 while lifting a pt, injured back pain.Intermittant numbness in right arm and right acapula swollen.       History of Present Illness:  Lorrie Garcia is an 49 year old female who presents with right back pain that began 2 day(s) ago. Patient states the pain started after she was helping the patient in assisted living. Patient describes pain as aching, sharp  located along the shoulder blade on right , that occurs all day.   Pain is 6/10, constant  It is aggravated by flexion,  lifting, twisting and lying in bed and relieved by rest..  These symptoms have not changed.  Associated symptoms:  Tingling and numbness in the right arm,. no shortness,of breath, no cough, no chest pain. Risk factors: none. Patient took Ibuprofen 600 milligram without any relief.  Patient has  Not had recurrent self limited episod of upper back pain in the past  Injury is work related    Medications: reviewed in Epic. Medication list entered by nurse and profile reviewed by myself    Allergies: reviewed in EPIC    Past Medical History:  Patient Active Problem List    Diagnosis Date Noted   • Chondromalacia of left patella 12/05/2019     Priority: Low   • Tear of lateral meniscus of left knee, current 12/05/2019     Priority: Low   • Follow-up examination following surgery 12/05/2019     Priority: Low   • Primary osteoarthritis of first carpometacarpal joint of right hand 11/13/2019     Priority: Low   • Acute pain of left knee 11/13/2019     Priority: Low       Social/Family History: reviewed in [employer] and non-contributory. Additional family history reviewed and negative for any other heart or lung disease, bleeding disorders, or issues related to this complaint      Review of Systems:  CONSTITUTIONAL: pertinent negatives: denies weight loss:sweats, fevers, fatigue or malaise  EYES; negative: blurry vision,   ENT: negative nasal congestion, sore throat  LYMPH: negative  lymph node enlargement  RESPIRATORY: negative: cough, SOB  CVS:  negative: chest pain  MUSCULOSKELETAL: positive:back pain  GI: pertinent negatives: denies poor appetite, dysphagia, nausea, vomiting, dyspepsia, abdominal pain, melena, diarrhea or weight loss  : negative  SKIN: no rash  NEURO: negative: weakness, numbness, unstable gait     ALL SYSTEMS REVIEWED AND OTHERWISE NEGATIVE    Physical Exam:  VITAL SIGNS:   Visit Vitals  /84   Pulse 72   Temp 98.9 °F (37.2 °C)   Resp 16   Ht 5' 6\" (1.676 m)   Wt 74.8 kg (165 lb)   SpO2 98%   BMI 26.63 kg/m²     GENERAL APPEARANCE: Appears healthy.  Alert; in moderate pain  distress.  Pleasant.  HEAD:atraumatic  CVS: normal peripheral circulation  LUNGS: good respiratory effort  GAIT:  antalgic.  INSPECTION OF SPINE:  NORMAL  TENDERNESS: tenderness at  the right upper back, close to scapula edge and in the right shoulder blade  MOBILITY: ROM is normal but painful with  flexion: moderate, extension: mild, rotation:   MOTOR FUNCTION:  Upper shoulder  strength intact and equal bilaterally   DEEP TENDON REFLEXES:Upper extremities  R 2+/L  2+  SENSATION:  intact and equal bilaterally on upper extremities  PULSES:  intact and equal bilaterally .  SKIN: Rash Not Present  PSYCH: appropriate mood and behaviour    Data:   none    Assessment:  1. Muscle strain of right upper back, initial encounter      48 y/o female presents to [facility] for evaluation of upper back pain. I think musculoskeletal etiology makes most sense given their history and exam.   They were instructed to follow up with PCP. All question were answered and criteria necessitating return visit to [facility] was discussed      PLAN:  1.Flexeril per med orders  2. Instructed on direct pressure technique3. age appropriate OTC apap or ibuprofen according to package directions.  Hold ibuprofen for stomach upset or bleeding. Counseled increased risk w/ ATC ibuprofen use.  4. OTC muscle balms may help    The plan was discussed  verbally and key components were summarized in the discharge instructions. All questions were answered. In discussing management and follow-up, they are advised that the plan is based only on information that was available at the time of the [facility] evaluation. Additional testing and treatment may be necessary, and outpatient evaluation is frequently required to ensure complete care. At the same time, there is always potential for symptoms to recur or worsen, or for additional signs or symptoms to develop that could substantially affect the treatment plan. If any new or uncontrolled  symptoms occur, or if there are any other concerns, they are advised to seek medical evaluation immediately.    Pandemic - [facility] State of Emergency     Pt was evaluated and treated during the officially declared [facility] State of Emergency due to the CoVid-19 pandemic.     Pt was screened, triaged, evaluated and treated per current CDC guidelines as they existed on date of service.     Limited evaluation and/or treatment may have been necessary due to supply limitations or shortages, critical needs of Pt or other patients, difficulties imposed by necessary PPE, or out of concern for the greater good of the community as a whole.     No longer doing nebs routinely in [facility] due to aerosolization risk     Normal vision: sees adequately in most situations; can see medication labels, newsprint

## 2024-09-17 ENCOUNTER — NON-APPOINTMENT (OUTPATIENT)
Age: 24
End: 2024-09-17

## 2024-09-17 VITALS
RESPIRATION RATE: 17 BRPM | TEMPERATURE: 98 F | SYSTOLIC BLOOD PRESSURE: 107 MMHG | HEART RATE: 83 BPM | OXYGEN SATURATION: 98 % | DIASTOLIC BLOOD PRESSURE: 70 MMHG

## 2024-09-17 LAB
HCT VFR BLD CALC: 30.7 % — LOW (ref 34.5–45)
HGB BLD-MCNC: 10 G/DL — LOW (ref 11.5–15.5)
T PALLIDUM AB TITR SER: NEGATIVE — SIGNIFICANT CHANGE UP

## 2024-09-17 PROCEDURE — 86900 BLOOD TYPING SEROLOGIC ABO: CPT

## 2024-09-17 PROCEDURE — 85025 COMPLETE CBC W/AUTO DIFF WBC: CPT

## 2024-09-17 PROCEDURE — 85018 HEMOGLOBIN: CPT

## 2024-09-17 PROCEDURE — 36415 COLL VENOUS BLD VENIPUNCTURE: CPT

## 2024-09-17 PROCEDURE — 85014 HEMATOCRIT: CPT

## 2024-09-17 PROCEDURE — 86850 RBC ANTIBODY SCREEN: CPT

## 2024-09-17 PROCEDURE — 86780 TREPONEMA PALLIDUM: CPT

## 2024-09-17 PROCEDURE — 59050 FETAL MONITOR W/REPORT: CPT

## 2024-09-17 PROCEDURE — 86901 BLOOD TYPING SEROLOGIC RH(D): CPT

## 2024-09-17 RX ORDER — VITAMIN A, ASCORBIC ACID, VITAMIN D, .ALPHA.-TOCOPHEROL, THIAMINE MONONITRATE, RIBOFLAVIN, NIACIN, PYRIDOXINE HYDROCHLORIDE, FOLIC ACID, CYANOCOBALAMIN, CALCIUM, IRON, MAGNESIUM, ZINC, AND COPPER 2700; 70; 400; 30; 1.6; 1.8; 18; 2.5; 1; 12; 100; 65; 25; 25; 2 [IU]/1; MG/1; [IU]/1; [IU]/1; MG/1; MG/1; MG/1; MG/1; MG/1; UG/1; MG/1; MG/1; MG/1; MG/1; MG/1
1 TABLET ORAL
Qty: 0 | Refills: 0 | DISCHARGE
Start: 2024-09-17

## 2024-09-17 RX ORDER — ACETAMINOPHEN 325 MG/1
2 TABLET ORAL
Qty: 56 | Refills: 0
Start: 2024-09-17 | End: 2024-09-23

## 2024-09-17 RX ORDER — IBUPROFEN 600 MG
1 TABLET ORAL
Qty: 28 | Refills: 0
Start: 2024-09-17 | End: 2024-09-23

## 2024-09-17 RX ADMIN — VITAMIN A, ASCORBIC ACID, VITAMIN D, .ALPHA.-TOCOPHEROL, THIAMINE MONONITRATE, RIBOFLAVIN, NIACIN, PYRIDOXINE HYDROCHLORIDE, FOLIC ACID, CYANOCOBALAMIN, CALCIUM, IRON, MAGNESIUM, ZINC, AND COPPER 1 TABLET(S): 2700; 70; 400; 30; 1.6; 1.8; 18; 2.5; 1; 12; 100; 65; 25; 25; 2 TABLET ORAL at 15:34

## 2024-09-17 RX ADMIN — Medication 600 MILLIGRAM(S): at 15:34

## 2024-09-17 RX ADMIN — Medication 80 MILLIGRAM(S): at 10:30

## 2024-09-17 RX ADMIN — Medication 600 MILLIGRAM(S): at 10:26

## 2024-09-17 RX ADMIN — ACETAMINOPHEN 975 MILLIGRAM(S): 325 TABLET ORAL at 05:08

## 2024-09-17 RX ADMIN — Medication 30 MILLILITER(S): at 10:29

## 2024-09-17 RX ADMIN — Medication 600 MILLIGRAM(S): at 21:06

## 2024-09-17 RX ADMIN — ACETAMINOPHEN 975 MILLIGRAM(S): 325 TABLET ORAL at 18:24

## 2024-09-17 NOTE — PROGRESS NOTE ADULT - ATTENDING COMMENTS
Pt is a 24y  now PPD#1 s/p spontaneous vaginal delivery at 38w6d gestation, uncomplicated  She feels well on PPD 1 and is without complaints.  Her pain is well controlled and her bleeding is light to moderate  Continue routine PP care  Discharge planning    EDER Flores

## 2024-09-17 NOTE — PROGRESS NOTE ADULT - SUBJECTIVE AND OBJECTIVE BOX
MARVIN JOHN is a 24y  now PPD#1 s/p spontaneous vaginal delivery at 38w6d gestation, uncomplicated.    S:    No acute events overnight.   The patient has no complaints.  Pain controlled with current treatment regimen.   She is ambulating without difficulty and tolerating PO.   + flatus/-BM/+ voiding   She endorses appropriate lochia, which is decreasing.   She denies fevers, chills, nausea and vomiting.   She denies lightheadedness, dizziness, palpitations, chest pain and SOB.     O:    T(C): 36.9 (24 @ 04:00), Max: 37 (24 @ 08:12)  HR: 84 (24 @ 04:00) (68 - 114)  BP: 100/67 (24 @ 04:00) (95/52 - 143/71)  RR: 16 (24 @ 04:00) (16 - 19)  SpO2: 97% (24 @ 04:00) (97% - 100%)    Gen: NAD, AOx3  Pulm: Resting comfortable on room air   Abdomen:  Soft, non-tender, non-distended  Uterus:  Fundus firm below umbilicus  VE:  Expectant lochia  Ext:  Non-tender and non-edematous                          10.0   x     )-----------( x        ( 17 Sep 2024 05:32 )             30.7

## 2024-09-17 NOTE — PROGRESS NOTE ADULT - ASSESSMENT
A/P:  24y  now PPD#1 s/p spontaneous vaginal delivery at 38w6d gestation, uncomplicated.    -Vital signs stable  -Hgb: 10.0  -Voiding, tolerating PO, bowel function nml   -Advance care as tolerated   -Continue routine postpartum care and education  -Healthy male infant, desires circumcision  -Dispo: Pt is stable, doing well and meeting all postpartum milestones. Possible discharge to home today pending attending approval.

## 2024-09-19 ENCOUNTER — EMERGENCY (EMERGENCY)
Facility: HOSPITAL | Age: 24
LOS: 1 days | Discharge: HOME | End: 2024-09-19
Attending: STUDENT IN AN ORGANIZED HEALTH CARE EDUCATION/TRAINING PROGRAM
Payer: MEDICAID

## 2024-09-19 VITALS
RESPIRATION RATE: 20 BRPM | SYSTOLIC BLOOD PRESSURE: 105 MMHG | DIASTOLIC BLOOD PRESSURE: 69 MMHG | WEIGHT: 132.06 LBS | HEIGHT: 64 IN | OXYGEN SATURATION: 98 % | HEART RATE: 89 BPM | TEMPERATURE: 98 F

## 2024-09-19 PROCEDURE — 99283 EMERGENCY DEPT VISIT LOW MDM: CPT

## 2024-09-19 PROCEDURE — 99284 EMERGENCY DEPT VISIT MOD MDM: CPT

## 2024-09-19 PROCEDURE — 99282 EMERGENCY DEPT VISIT SF MDM: CPT

## 2024-09-19 NOTE — ED PROVIDER NOTE - PHYSICAL EXAMINATION
Const: Awake, alert and oriented. In no acute distress. Well appearing.  HEENT: NC/AT. Moist mucous membranes.  Eyes: No scleral icterus. EOMI.  Neck:. Soft and supple. Full ROM without pain.  Cardiac: Regular rate and regular rhythm. +S1/S2. Peripheral pulses 2+ and symmetric. No LE edema.  Resp: Speaking in full sentences. No evidence of respiratory distress. No wheezes, rales or rhonchi.  Abd: Soft, non-tender, non-distended. Normal bowel sounds in all 4 quadrants. No guarding or rebound.  Back: Spine midline and non-tender. No CVAT.  Skin: No rashes, abrasions or lacerations.  Lymph: No cervical lymphadenopathy.  Neuro: Awake, alert & oriented x 3. Moves all extremities symmetrically.  Breast:   Engorgement bilaterally no skin changes no approved around show no nipple discharge Chaperone: Haley MCCABE

## 2024-09-19 NOTE — ED PROVIDER NOTE - IV ALTEPLASE EXCL REL HIDDEN
show General Sunscreen Counseling: I recommended a broad spectrum sunscreen with a SPF of 30 or higher.  I explained that SPF 30 sunscreens block approximately 97 percent of the sun's harmful rays.  Sunscreens should be applied at least 15 minutes prior to expected sun exposure and then every 2 hours after that as long as sun exposure continues. If swimming or exercising sunscreen should be reapplied every 45 minutes to an hour after getting wet or sweating.  One ounce, or the equivalent of a shot glass full of sunscreen, is adequate to protect the skin not covered by a bathing suit. I also recommended a lip balm with a sunscreen as well. Sun protective clothing can be used in lieu of sunscreen but must be worn the entire time you are exposed to the sun's rays. Detail Level: Detailed

## 2024-09-19 NOTE — ED PROVIDER NOTE - NSFOLLOWUPINSTRUCTIONS_ED_ALL_ED_FT
Breast Engorgement    Breast engorgement is the overfilling of your breasts with breast milk. It is usually caused by delaying feedings, which can cause milk to build up.    Breast engorgement can happen at any time while you are breast feeding, and is normal in the first 3–5 days after giving birth. The condition can make your breasts feel heavy, full, hard, tightly stretched, warm, and tender. Breast engorgement should improve within 24–48 hours of feeding your baby or expressing your milk.    Follow these instructions at home:      When to breastfeed or pump    Breastfeed when your baby shows signs of hunger. This is called "breastfeeding on demand."  Breastfeed or use a breast pump to remove milk from your breasts when you feel the need to reduce the fullness of your breasts.  If your baby is younger than 1 month, make sure you are breastfeeding every 1–3 hours during the day. You may need to wake up your baby to feed if he or she is asleep at a feeding time.  Do not allow your baby to sleep longer than 5 hours during the night without a feeding.  Do not delay feedings.  If you are returning to work or are away from home for an extended period, try to pump your milk on the same schedule as when your baby would breastfeed.        Before breastfeeding or pumping:    Increase the circulation in your breasts and help your milk flow. Try either of these methods:    Taking a warm shower.  Applying warm, water-soaked hand towels to your breasts.  Massaging your breasts.  Pump or hand-express breast milk before breastfeeding to soften your breast, areola, and nipple.        During breastfeeding or pumping:    Try to relax when it is time to feed your baby. This helps to trigger your "let-down reflex," which releases milk from your breast.  Ensure your baby is latched on to your breast and positioned properly while breastfeeding.  Empty your breasts completely when breastfeeding or pumping.  Allow your baby to remain at your breast as long as he or she is latched on well and sucking. Your baby will let you know when he or she is done breastfeeding by pulling away from your breast or falling asleep.  Massage your breasts to help your milk flow.        Managing pain and swelling     Take over-the-counter and prescription medicines only as told by your health care provider.  If directed, put ice on your breasts:    Put ice in a plastic bag.  Place a towel between your skin and the bag.  Leave the ice on for 20 minutes, 2–3 times a day.  If you feel pain while breastfeeding, take your baby off your breast and try again.        General instructions    After breastfeeding or pumping wear a snug bra or tank top for 1–2 days. This will signal your body to slightly decrease how much milk it makes. Once the engorgement passes, make sure you to wear a well-fitted, supportive bra and regular clothes.  Drink enough fluid to keep your urine clear or pale yellow.  Avoid introducing bottles or pacifiers to your baby in the early weeks of breastfeeding. Wait to introduce these things until after resolving any breastfeeding challenges.    Contact a health care provider if:  Engorgement lasts longer than 2 days, even after treatment.  You have flu-like symptoms, such as a fever, chills, or body aches.  You have nausea or you vomit.  Your breasts become red and painful.  You have a lump in your breast.  Your nipples continue to crack or start to ooze.  There is yellow discharge coming from a nipple.  You have pain while breastfeeding, and it does not go away once you take your baby off your breast and try again.    Get help right away if:  There is pus or blood in your breast milk.  You have sudden, severe symptoms.  You have red streaks near your breast.  Both breasts appear infected and you cannot breastfeed.    Summary  Breast engorgement is the overfilling of your breasts with breast milk. It is usually caused by delayed feeding.  Although it is normal to experience breast engorgement 3–5 days after giving birth, it can happen at any time while breastfeeding.  Do not delay feedings. Breastfeed on demand to help prevent engorgement.  Increase the circulation in your breasts and help your milk flow before feeding your baby. You can do this by taking a warm shower, applying warm water-soaked hand towels, or massaging your breasts.    ADDITIONAL NOTES AND INSTRUCTIONS    Please follow up with your Primary MD in 24-48 hr.  Seek immediate medical care for any new/worsening signs or symptoms.

## 2024-09-19 NOTE — ED PROVIDER NOTE - PATIENT PORTAL LINK FT
You can access the FollowMyHealth Patient Portal offered by [facility] by registering at the following website: [email or web address]. By joining [employer]’s FollowMyHealth portal, you will also be able to view your health information using other applications (apps) compatible with our system.

## 2024-09-19 NOTE — ED PROVIDER NOTE - OBJECTIVE STATEMENT
24-year-old female G2, P2 status post  3 days ago presents with breast pain.  Patient states she has been feeling lumps in her breast since yesterday morning.  Patient states she is attempting to breast-feed but is supplementing with formula feed due to baby not latching on.  Patient reports breast engorgement bilaterally no discharge from nipple no changes in skin color. Pt denies fevers/chills, ha, loc, focal neuro deficits, cp/sob/palp, cough, abd pain/n/v/d, urinary symptoms, recent travel

## 2024-10-01 ENCOUNTER — APPOINTMENT (OUTPATIENT)
Dept: OBGYN | Facility: CLINIC | Age: 24
End: 2024-10-01
Payer: MEDICAID

## 2024-10-01 VITALS
BODY MASS INDEX: 23.55 KG/M2 | WEIGHT: 128 LBS | HEIGHT: 62 IN | SYSTOLIC BLOOD PRESSURE: 106 MMHG | DIASTOLIC BLOOD PRESSURE: 66 MMHG

## 2024-10-01 DIAGNOSIS — Z13.32 ENCOUNTER FOR SCREENING FOR MATERNAL DEPRESSION: ICD-10-CM

## 2024-10-01 PROCEDURE — 0503F POSTPARTUM CARE VISIT: CPT

## 2024-10-01 RX ORDER — PRENATAL VIT NO.130/IRON/FOLIC 27MG-0.8MG
27-0.8 TABLET ORAL
Qty: 60 | Refills: 3 | Status: ACTIVE | COMMUNITY
Start: 2024-10-01 | End: 1900-01-01

## 2024-10-02 NOTE — HISTORY OF PRESENT ILLNESS
[Postpartum Follow Up] : postpartum follow up [None] : No associated symptoms are reported [Breastfeeding] : currently nursing [BF with Difficulty] : nursing with difficulty [Complications:___] : no complications [Delivery Date: ___] : on [unfilled] [] : delivered by vaginal delivery [Male] : Delivery History: baby boy [Wt. ___] : weighing [unfilled] [Resumed Menses] : has not resumed her menses [Resumed [place]] : has not resumed intercourse [Intended Contraception] : Intended Contraception: [Abdominal Pain] : no abdominal pain [BreastFeeding Problems] : no breastfeeding problems [Chest Pain] : no chest pain [Cracked Nipples] : no cracked nipples [S/Sx PP Depression] : no signs/symptoms of postpartum depression [Heavy Bleeding] : no heavy bleeding [Irregular Bleeding] : no irregular bleeding [Suicidal Ideation] : no suicidal ideation [Chills] : no chills [Fatigue] : no fatigue [Dysuria] : no dysuria [Fever] : no fever [Headache] : no headache [Nausea] : no nausea [Vomiting] : no vomiting [Not Done] : Examination of breasts not done [Doing Well] : is doing well [No Sign of Infection] : is showing no signs of infection [Excellent Pain Control] : has excellent pain control [No [place]] : to avoid sexual intercourse [No Tampons/Suppositories] : against using tampons or vaginal suppositories [Limited ADLs] : to participate in activities of daily living with limitations [No Work] : not to work [Limited Housework] : to do housework with limitations [No Sports] : not to participate in sports [de-identified] : pumping, poor latch [de-identified] : feels sad sometimes, edps is 2  [de-identified] : contraception reviewed [de-identified] : rto 4 weeks for pp exam

## 2024-10-02 NOTE — HISTORY OF PRESENT ILLNESS
[Postpartum Follow Up] : postpartum follow up [None] : No associated symptoms are reported [Breastfeeding] : currently nursing [BF with Difficulty] : nursing with difficulty [Complications:___] : no complications [Delivery Date: ___] : on [unfilled] [] : delivered by vaginal delivery [Male] : Delivery History: baby boy [Wt. ___] : weighing [unfilled] [Resumed Menses] : has not resumed her menses [Resumed [place]] : has not resumed intercourse [Intended Contraception] : Intended Contraception: [Abdominal Pain] : no abdominal pain [BreastFeeding Problems] : no breastfeeding problems [Chest Pain] : no chest pain [Cracked Nipples] : no cracked nipples [S/Sx PP Depression] : no signs/symptoms of postpartum depression [Heavy Bleeding] : no heavy bleeding [Irregular Bleeding] : no irregular bleeding [Suicidal Ideation] : no suicidal ideation [Chills] : no chills [Fatigue] : no fatigue [Dysuria] : no dysuria [Fever] : no fever [Headache] : no headache [Nausea] : no nausea [Vomiting] : no vomiting [Not Done] : Examination of breasts not done [Doing Well] : is doing well [No Sign of Infection] : is showing no signs of infection [Excellent Pain Control] : has excellent pain control [No [place]] : to avoid sexual intercourse [No Tampons/Suppositories] : against using tampons or vaginal suppositories [Limited ADLs] : to participate in activities of daily living with limitations [No Work] : not to work [Limited Housework] : to do housework with limitations [No Sports] : not to participate in sports [de-identified] : pumping, poor latch [de-identified] : feels sad sometimes, edps is 2  [de-identified] : contraception reviewed [de-identified] : rto 4 weeks for pp exam

## 2024-10-31 ENCOUNTER — APPOINTMENT (OUTPATIENT)
Dept: OBGYN | Facility: CLINIC | Age: 24
End: 2024-10-31
Payer: MEDICAID

## 2024-10-31 VITALS
SYSTOLIC BLOOD PRESSURE: 100 MMHG | WEIGHT: 132.6 LBS | DIASTOLIC BLOOD PRESSURE: 60 MMHG | HEIGHT: 62 IN | BODY MASS INDEX: 24.4 KG/M2

## 2024-10-31 PROCEDURE — 0503F POSTPARTUM CARE VISIT: CPT

## 2024-11-07 ENCOUNTER — APPOINTMENT (OUTPATIENT)
Dept: OBGYN | Facility: CLINIC | Age: 24
End: 2024-11-07
Payer: MEDICAID

## 2024-11-07 VITALS
WEIGHT: 133.25 LBS | HEIGHT: 62 IN | DIASTOLIC BLOOD PRESSURE: 68 MMHG | BODY MASS INDEX: 24.52 KG/M2 | SYSTOLIC BLOOD PRESSURE: 100 MMHG

## 2024-11-07 DIAGNOSIS — Z30.017 ENCOUNTER FOR INITIAL PRESCRIPTION OF IMPLANTABLE SUBDERMAL CONTRACEPTIVE: ICD-10-CM

## 2024-11-07 LAB
HCG UR QL: NEGATIVE
QUALITY CONTROL: YES

## 2024-11-07 PROCEDURE — 11981 INSERTION DRUG DLVR IMPLANT: CPT

## 2024-11-07 PROCEDURE — 81025 URINE PREGNANCY TEST: CPT

## 2025-02-12 ENCOUNTER — APPOINTMENT (OUTPATIENT)
Dept: OBGYN | Facility: CLINIC | Age: 25
End: 2025-02-12

## 2025-03-09 ENCOUNTER — EMERGENCY (EMERGENCY)
Facility: HOSPITAL | Age: 25
LOS: 1 days | Discharge: HOME | End: 2025-03-09
Attending: EMERGENCY MEDICINE
Payer: SELF-PAY

## 2025-03-09 VITALS
TEMPERATURE: 98 F | HEART RATE: 78 BPM | RESPIRATION RATE: 18 BRPM | WEIGHT: 127.21 LBS | OXYGEN SATURATION: 96 % | SYSTOLIC BLOOD PRESSURE: 113 MMHG | DIASTOLIC BLOOD PRESSURE: 66 MMHG | HEIGHT: 64 IN

## 2025-03-09 PROCEDURE — 96372 THER/PROPH/DIAG INJ SC/IM: CPT

## 2025-03-09 PROCEDURE — 99284 EMERGENCY DEPT VISIT MOD MDM: CPT

## 2025-03-09 PROCEDURE — 99283 EMERGENCY DEPT VISIT LOW MDM: CPT | Mod: 25

## 2025-03-09 RX ORDER — METHOCARBAMOL 500 MG/1
1 TABLET, FILM COATED ORAL
Qty: 10 | Refills: 0
Start: 2025-03-09 | End: 2025-03-13

## 2025-03-09 RX ORDER — KETOROLAC TROMETHAMINE 30 MG/ML
30 INJECTION, SOLUTION INTRAMUSCULAR; INTRAVENOUS ONCE
Refills: 0 | Status: DISCONTINUED | OUTPATIENT
Start: 2025-03-09 | End: 2025-03-09

## 2025-03-09 RX ADMIN — KETOROLAC TROMETHAMINE 30 MILLIGRAM(S): 30 INJECTION, SOLUTION INTRAMUSCULAR; INTRAVENOUS at 18:00
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
## 2025-03-09 NOTE — ED ADULT TRIAGE NOTE - PATIENT ON (OXYGEN DELIVERY METHOD)
them away in the cabinets, vacuuming, and laundry without increased symptoms or restriction to work towards return to PLOF.    Status: [x] Progressing: [] Met: [x] Not Met: [] Adjusted  Patient will be able to comfortably drive at least 1 hour without increased symptoms or restriction in L UE.    Status: [x] Progressing: [] Met: [x] Not Met: [] Adjusted    TREATMENT PLAN     Frequency/Duration: 1-2x/week for 4-6 weeks for the following treatment interventions:    Interventions:  [x] Therapeutic exercise including: strength training, ROM, including postural re-education.   [x] NMR activation and proprioception, including postural re-education.    [x] Manual therapy as indicated to include: PROM, Gr I-IV mobilizations, STM, and Dry Needling/IASTM  [x] Modalities as needed that may include:  DN/estim  [x] Patient education on joint protection, postural re-education, activity modification, progression of HEP.        [] Aquatic Therapy    Plan: 2x per week for 4-6 weeks    Electronically Signed by Gloria Moraes PT  Date: 04/01/2024    Note: Portions of this note have been templated and/or copied from initial evaluation, reassessments and prior notes for documentation efficiency.         room air
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
## 2025-03-09 NOTE — ED PROVIDER NOTE - PATIENT PORTAL LINK FT
You can access the FollowMyHealth Patient Portal offered by [facility] by registering at the following website: [email or web address]. By joining [employer]’s FollowMyHealth portal, you will also be able to view your health information using other applications (apps) compatible with our system.
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
## 2025-03-09 NOTE — ED PROVIDER NOTE - NSFOLLOWUPINSTRUCTIONS_ED_ALL_ED_FT
Motrin and Tylenol as needed for pain  Rest and stay hydrated  Return promptly in case of worsening symptoms

## 2025-04-28 ENCOUNTER — APPOINTMENT (OUTPATIENT)
Dept: OBGYN | Facility: CLINIC | Age: 25
End: 2025-04-28
Payer: MEDICAID

## 2025-04-28 VITALS
DIASTOLIC BLOOD PRESSURE: 60 MMHG | SYSTOLIC BLOOD PRESSURE: 100 MMHG | HEIGHT: 62 IN | WEIGHT: 126.4 LBS | BODY MASS INDEX: 23.26 KG/M2

## 2025-04-28 DIAGNOSIS — N92.1 EXCESSIVE AND FREQUENT MENSTRUATION WITH IRREGULAR CYCLE: ICD-10-CM

## 2025-04-28 DIAGNOSIS — Z97.5 EXCESSIVE AND FREQUENT MENSTRUATION WITH IRREGULAR CYCLE: ICD-10-CM

## 2025-04-28 DIAGNOSIS — Z34.93 ENCOUNTER FOR SUPERVISION OF NORMAL PREGNANCY, UNSPECIFIED, THIRD TRIMESTER: ICD-10-CM

## 2025-04-28 DIAGNOSIS — Z97.5 PRESENCE OF (INTRAUTERINE) CONTRACEPTIVE DEVICE: ICD-10-CM

## 2025-04-28 DIAGNOSIS — O21.9 VOMITING OF PREGNANCY, UNSPECIFIED: ICD-10-CM

## 2025-04-28 DIAGNOSIS — Z13.32 ENCOUNTER FOR SCREENING FOR MATERNAL DEPRESSION: ICD-10-CM

## 2025-04-28 DIAGNOSIS — Z34.91 ENCOUNTER FOR SUPERVISION OF NORMAL PREGNANCY, UNSPECIFIED, FIRST TRIMESTER: ICD-10-CM

## 2025-04-28 DIAGNOSIS — Z78.9 OTHER SPECIFIED HEALTH STATUS: ICD-10-CM

## 2025-04-28 DIAGNOSIS — O09.899 SUPERVISION OF OTHER HIGH RISK PREGNANCIES, UNSPECIFIED TRIMESTER: ICD-10-CM

## 2025-04-28 DIAGNOSIS — Z34.92 ENCOUNTER FOR SUPERVISION OF NORMAL PREGNANCY, UNSPECIFIED, SECOND TRIMESTER: ICD-10-CM

## 2025-04-28 DIAGNOSIS — Z32.01 ENCOUNTER FOR PREGNANCY TEST, RESULT POSITIVE: ICD-10-CM

## 2025-04-28 DIAGNOSIS — Z11.1 ENCOUNTER FOR SCREENING FOR RESPIRATORY TUBERCULOSIS: ICD-10-CM

## 2025-04-28 DIAGNOSIS — Z30.017 ENCOUNTER FOR INITIAL PRESCRIPTION OF IMPLANTABLE SUBDERMAL CONTRACEPTIVE: ICD-10-CM

## 2025-04-28 DIAGNOSIS — Z13.29 ENCOUNTER FOR SCREENING FOR OTHER SUSPECTED ENDOCRINE DISORDER: ICD-10-CM

## 2025-04-28 LAB
HCG UR QL: NEGATIVE
QUALITY CONTROL: YES

## 2025-04-28 PROCEDURE — 99213 OFFICE O/P EST LOW 20 MIN: CPT

## 2025-04-28 PROCEDURE — 81025 URINE PREGNANCY TEST: CPT

## 2025-05-01 ENCOUNTER — NON-APPOINTMENT (OUTPATIENT)
Age: 25
End: 2025-05-01

## 2025-05-01 LAB
BASOPHILS # BLD AUTO: 0.02 K/UL
BASOPHILS NFR BLD AUTO: 0.3 %
C TRACH RRNA SPEC QL NAA+PROBE: NOT DETECTED
EOSINOPHIL # BLD AUTO: 0.17 K/UL
EOSINOPHIL NFR BLD AUTO: 2.8 %
HCT VFR BLD CALC: 41.6 %
HGB BLD-MCNC: 13 G/DL
IMM GRANULOCYTES NFR BLD AUTO: 0.2 %
LYMPHOCYTES # BLD AUTO: 2.47 K/UL
LYMPHOCYTES NFR BLD AUTO: 41 %
MAN DIFF?: NORMAL
MCHC RBC-ENTMCNC: 27.5 PG
MCHC RBC-ENTMCNC: 31.3 G/DL
MCV RBC AUTO: 87.9 FL
MONOCYTES # BLD AUTO: 0.41 K/UL
MONOCYTES NFR BLD AUTO: 6.8 %
N GONORRHOEA RRNA SPEC QL NAA+PROBE: NOT DETECTED
NEUTROPHILS # BLD AUTO: 2.94 K/UL
NEUTROPHILS NFR BLD AUTO: 48.9 %
PLATELET # BLD AUTO: 254 K/UL
RBC # BLD: 4.73 M/UL
RBC # FLD: 12.5 %
SOURCE AMPLIFICATION: NORMAL
WBC # FLD AUTO: 6.02 K/UL

## 2025-05-12 ENCOUNTER — APPOINTMENT (OUTPATIENT)
Dept: OBGYN | Facility: CLINIC | Age: 25
End: 2025-05-12

## 2025-05-18 NOTE — OB RN TRIAGE NOTE - NS_MODEOFARRIVAL_OBGYN_ALL_OB
Will keep on same dose levothyroxine it was changed only around 2 weeks ago we will repeat another lab in 1 month from today.  
Car

## 2025-06-02 ENCOUNTER — APPOINTMENT (OUTPATIENT)
Dept: ANTEPARTUM | Facility: CLINIC | Age: 25
End: 2025-06-02
Payer: MEDICAID

## 2025-06-02 ENCOUNTER — APPOINTMENT (OUTPATIENT)
Dept: OBGYN | Facility: CLINIC | Age: 25
End: 2025-06-02
Payer: MEDICAID

## 2025-06-02 ENCOUNTER — ASOB RESULT (OUTPATIENT)
Age: 25
End: 2025-06-02

## 2025-06-02 VITALS
HEIGHT: 62 IN | BODY MASS INDEX: 23.41 KG/M2 | DIASTOLIC BLOOD PRESSURE: 62 MMHG | WEIGHT: 127.2 LBS | SYSTOLIC BLOOD PRESSURE: 102 MMHG

## 2025-06-02 DIAGNOSIS — N89.8 OTHER SPECIFIED NONINFLAMMATORY DISORDERS OF VAGINA: ICD-10-CM

## 2025-06-02 PROCEDURE — 76830 TRANSVAGINAL US NON-OB: CPT

## 2025-06-02 PROCEDURE — 99213 OFFICE O/P EST LOW 20 MIN: CPT

## 2025-06-02 PROCEDURE — 76857 US EXAM PELVIC LIMITED: CPT | Mod: 59

## 2025-06-02 RX ORDER — TERCONAZOLE 8 MG/G
0.8 CREAM VAGINAL
Qty: 1 | Refills: 1 | Status: ACTIVE | COMMUNITY
Start: 2025-06-02 | End: 1900-01-01

## 2025-07-14 ENCOUNTER — APPOINTMENT (OUTPATIENT)
Dept: OBGYN | Facility: CLINIC | Age: 25
End: 2025-07-14